# Patient Record
Sex: FEMALE | Race: WHITE | Employment: UNEMPLOYED | URBAN - METROPOLITAN AREA
[De-identification: names, ages, dates, MRNs, and addresses within clinical notes are randomized per-mention and may not be internally consistent; named-entity substitution may affect disease eponyms.]

---

## 2017-08-02 LAB
ANTIBODY SCREEN, EXTERNAL: NORMAL
CHLAMYDIA, EXTERNAL: NEGATIVE
HBSAG, EXTERNAL: NORMAL
HEPATITIS C AB,   EXT: NEGATIVE
HIV, EXTERNAL: NORMAL
N. GONORRHEA, EXTERNAL: NEGATIVE
RPR, EXTERNAL: NEGATIVE
RUBELLA, EXTERNAL: NORMAL
TYPE, ABO & RH, EXTERNAL: NORMAL

## 2017-12-13 ENCOUNTER — OFFICE VISIT (OUTPATIENT)
Dept: FAMILY MEDICINE CLINIC | Age: 32
End: 2017-12-13

## 2017-12-13 VITALS
BODY MASS INDEX: 37.77 KG/M2 | OXYGEN SATURATION: 98 % | HEIGHT: 66 IN | HEART RATE: 78 BPM | SYSTOLIC BLOOD PRESSURE: 113 MMHG | WEIGHT: 235 LBS | TEMPERATURE: 96.2 F | RESPIRATION RATE: 18 BRPM | DIASTOLIC BLOOD PRESSURE: 58 MMHG

## 2017-12-13 DIAGNOSIS — H02.823 MILIA OF EYELIDS OF BOTH EYES: ICD-10-CM

## 2017-12-13 DIAGNOSIS — H02.826 MILIA OF EYELIDS OF BOTH EYES: ICD-10-CM

## 2017-12-13 DIAGNOSIS — D36.7 DERMOID CYST OF LEG, RIGHT: Primary | ICD-10-CM

## 2017-12-13 RX ORDER — HYDROXYPROGESTERONE CAPROATE 250 MG/ML
250 INJECTION INTRAMUSCULAR
COMMUNITY
End: 2018-05-14

## 2017-12-13 NOTE — MR AVS SNAPSHOT
Visit Information Date & Time Provider Department Dept. Phone Encounter #  
 12/13/2017  9:30 AM Gwen Ibarra, [address] [facility] [phone] [id number] Follow-up Instructions Return if symptoms worsen or fail to improve. Follow-up and Disposition History Upcoming Health Maintenance Date Due DTaP/Tdap/Td series (1 - Tdap) 1/4/2005 PAP AKA CERVICAL CYTOLOGY 1/4/2005 Influenza Age 5 to Adult 8/1/2017 Allergies as of 12/13/2017  Review Complete On: 12/13/2017 By: Gwen Ibarra MD  
 No Known Allergies Current Immunizations  Never Reviewed No immunizations on file. Not reviewed this visit You Were Diagnosed With   
  
 Codes Comments Dermoid cyst of leg, right    -  Primary ICD-10-CM: D23.71 ICD-9-CM: 216.7 Milia of eyelids of both eyes     ICD-10-CM: H02.823, H02.826 ICD-9-CM: 374.84 Vitals BP Pulse Temp Resp Height(growth percentile) Weight(growth percentile) 113/58 78 96.2 °F (35.7 °C) (Oral) 18 5' 6\" (1.676 m) 235 lb (106.6 kg) SpO2 BMI OB Status Smoking Status 98% 37.93 kg/m2 Pregnant Former Smoker Vitals History BMI and BSA Data Body Mass Index Body Surface Area  
 37.93 kg/m 2 2.23 m 2 Preferred Pharmacy Pharmacy Name Phone [facility] [address]  [facility] [phone] Your Updated Medication List  
  
   
This list is accurate as of: 12/13/17 10:43 AM.  Always use your most recent med list.  
  
  
  
  
 MARYAM 250 mg/mL (1 mL) Oil injection Generic drug:  HYDROXYprogesterone (PF)  
250 mg by IntraMUSCular route every seven (7) days. prenatal multivit-ca-min-fe-fa Tab Take  by mouth. We Performed the Following REFERRAL TO GENERAL SURGERY [REF27 Custom] Follow-up Instructions Return if symptoms worsen or fail to improve. Referral Information Referral ID Referred By Referred To  
  
 [id number] [facility], Kevin Elliott MD   
   [id number] [facility] [address] [place] [address]   
   Rosebud Harada, [facility] Phone: [phone] Fax: [phone] Visits Status Start Date End Date 10 Authorized 12/13/17 12/13/18 If your referral has a status of pending review or denied, additional information will be sent to support the outcome of this decision. Introducing [facility] & HEALTH SERVICES! [employer] introduces [email or web address] patient portal. Now you can access parts of your medical record, email your doctor's office, and request medication refills online. 1. In your internet browser, go to [email or web address] 2. Click on the First Time User? Click Here link in the Sign In box. You will see the New Member Sign Up page. 3. Enter your [email or web address] Access Code exactly as it appears below. You will not need to use this code after youve completed the sign-up process. If you do not sign up before the expiration date, you must request a new code. · [email or web address] Access Code: [email or web address] Expires: 3/13/2018 10:34 AM 
 
4. Enter the last four digits of your Social Security Number (xxxx) and Date of Birth (mm/dd/yyyy) as indicated and click Submit. You will be taken to the next sign-up page. 5. Create a [email or web address] ID. This will be your [email or web address] login ID and cannot be changed, so think of one that is secure and easy to remember. 6. Create a [email or web address] password. You can change your password at any time. 7. Enter your Password Reset Question and Answer. This can be used at a later time if you forget your password. 8. Enter your e-mail address. You will receive e-mail notification when new information is available in [address] 9. Click Sign Up. You can now view and download portions of your medical record. 10. Click the Download Summary menu link to download a portable copy of your medical information. If you have questions, please visit the Frequently Asked Questions section of the [employer] website. Remember, Bromium is NOT to be used for urgent needs. For medical emergencies, dial 911. Now available from your iPhone and Android! Please provide this summary of care documentation to your next provider. Your primary care clinician is listed as Kailey Joiner. If you have any questions after today's visit, please call [phone].

## 2017-12-13 NOTE — PROGRESS NOTES
Gabriella Toth is a 34 y.o.  female and presents with     Chief Complaint   Patient presents with    Mass    Hemorrhoids       Pt is here to establish care. Pt is 6 months pregnant. Pt has some lumps underneath her skin on her right thigh and left arm. Pt  Has h/o lumps in the past and 8 years ago they were removed and she told  They were cysts. Pt also has some constipation and hemorrhoids. Pt  Used to smoke but no more . Pt does not drink alcohol. No h/o gestational DM. Pt had early pregnancy and is currently on  progesetrone hormone . No past medical history on file. No past surgical history on file. Current Outpatient Prescriptions   Medication Sig    prenatal multivit-ca-min-fe-fa tab Take  by mouth.  HYDROXYprogesterone, PF, (MARYAM) 250 mg/mL (1 mL) oil injection 250 mg by IntraMUSCular route every seven (7) days. No current facility-administered medications for this visit. Health Maintenance   Topic Date Due    DTaP/Tdap/Td series (1 - Tdap) 01/04/2005    PAP AKA CERVICAL CYTOLOGY  01/04/2005    Influenza Age 5 to Adult  08/01/2017       There is no immunization history on file for this patient. No LMP recorded. Patient is pregnant. Allergies and Intolerances:   No Known Allergies    Family History:   No family history on file. Social History:   She  reports that she has quit smoking. She has never used smokeless tobacco.  She  reports that she drinks alcohol.             Review of Systems:   General: negative for - chills, fatigue, fever, weight change  Psych: negative for - anxiety, depression, irritability or mood swings  ENT: negative for - headaches, hearing change, nasal congestion, oral lesions, sneezing or sore throat  Heme/ Lymph: negative for - bleeding problems, bruising, pallor or swollen lymph nodes  Endo: negative for - hot flashes, polydipsia/polyuria or temperature intolerance  Resp: negative for - cough, shortness of breath or wheezing  CV: negative for - chest pain, edema or palpitations  GI: negative for - abdominal pain, change in bowel habits, constipation, diarrhea or nausea/vomiting  : negative for - dysuria, hematuria, incontinence, pelvic pain or vulvar/vaginal symptoms  MSK: negative for - joint pain, joint swelling or muscle pain  Neuro: negative for - confusion, headaches, seizures or weakness  Derm: negative for - dry skin, hair changes, rash or skin lesion changes, pos for cyst on rt leg,           Physical:   Vitals:   Vitals:    12/13/17 0937   BP: 113/58   Pulse: 78   Resp: 18   Temp: 96.2 °F (35.7 °C)   TempSrc: Oral   SpO2: 98%   Weight: 235 lb (106.6 kg)   Height: 5' 6\" (1.676 m)           Exam:   HEENT- atraumatic,normocephalic, awake, oriented, well nourished, milia under eyelids  Neck - supple,no enlarged lymph nodes, no JVD, no thyromegaly  Chest- CTA, no rhonchi, no crackles  Heart- rrr, no murmurs / gallop/rub  Abdomen- soft,BS+,NT, no hepatosplenomegaly  Ext - no c/c/edema , cyst  On rt leg , dernal, tender to touch, gravid uterus  Neuro- no focal deficits. Power 5/5 all extremities  Skin - warm,dry, no obvious rashes. Review of Data:   LABS:   No results found for: WBC, HGB, HCT, PLT, HGBEXT, HCTEXT, PLTEXT, HGBEXT, HCTEXT, PLTEXT  No results found for: NA, K, CL, CO2, GLU, BUN, CREA  No results found for: CHOL, CHOLX, CHLST, CHOLV, HDL, LDL, LDLC, DLDLP, TGLX, TRIGL, TRIGP  No results found for: GPT        Impression / Plan:        ICD-10-CM ICD-9-CM    1. Dermoid cyst of leg, right D23.71 216.7 REFERRAL TO GENERAL SURGERY   2. Milia of eyelids of both eyes H02.823 374.84     H02.826       Constipation , external hemorrhoids- wll need referaal to colorectal after she delivers baby. Avoid cosntipation. Explained to patient risk benefits of the medications. Advised patient to stop meds if having any side effects. Pt verbalized understanding of the instructions.     I have discussed the diagnosis with the patient and the intended plan as seen in the above orders. The patient has received an after-visit summary and questions were answered concerning future plans. I have discussed medication side effects and warnings with the patient as well. I have reviewed the plan of care with the patient, accepted their input and they are in agreement with the treatment goals. Reviewed plan of care. Patient has provided input and agrees with goals.     Follow-up Disposition: Not on Christa Boland MD

## 2018-01-31 ENCOUNTER — OFFICE VISIT (OUTPATIENT)
Dept: SURGERY | Age: 33
End: 2018-01-31

## 2018-01-31 VITALS
BODY MASS INDEX: 37.12 KG/M2 | WEIGHT: 231 LBS | TEMPERATURE: 96.5 F | HEART RATE: 84 BPM | HEIGHT: 66 IN | RESPIRATION RATE: 20 BRPM | SYSTOLIC BLOOD PRESSURE: 109 MMHG | DIASTOLIC BLOOD PRESSURE: 74 MMHG

## 2018-01-31 DIAGNOSIS — D17.23 LIPOMA OF RIGHT LOWER EXTREMITY: Primary | ICD-10-CM

## 2018-01-31 NOTE — PROGRESS NOTES
Hernia Evaluation      Subjective:     Eddie Reza is a 28 y.o. female with a history of multiple small painful nodules in her subcutaneous fat. These have been excised in the past on her thigh and left upper arm. She has 3 new ones on her lateral right thigh, 2 of which are painful. They have been present for about 3 months. She is about 6 months pregnant. There are no active problems to display for this patient. No past medical history on file. No past surgical history on file. Social History   Substance Use Topics    Smoking status: Former Smoker    Smokeless tobacco: Never Used    Alcohol use Yes      Family History   Problem Relation Age of Onset    No Known Problems Mother     Hypertension Father     Cancer Father       Current Outpatient Prescriptions   Medication Sig    prenatal multivit-ca-min-fe-fa tab Take  by mouth.  HYDROXYprogesterone, PF, (MARYAM) 250 mg/mL (1 mL) oil injection 250 mg by IntraMUSCular route every seven (7) days. No current facility-administered medications for this visit.        No Known Allergies     Review of Systems:  Positive in BOLD    CONST: Fever, weight loss, fatigue or chills  GI: Nausea, vomiting, abdominal pain, change in bowel habits, hematochezia, melena, and GERD   INTEG: Dermatitis, abnormal moles, subcutaneous nodules  HEENT: Recent changes in vision, vertigo, epistaxis, dysphagia and hoarseness  CV: Chest pain, palpitations, HTN, edema and varicosities  RESP: Cough, shortness of breath, wheezing, hemoptysis, snoring and reactive airway disease  : Hematuria, dysuria, frequency, urgency, nocturia and stress urinary incontinence   MS: Weakness, joint pain and arthritis  ENDO: Diabetes, thyroid disease, polyuria, polydipsia, polyphagia, poor wound healing, heat intolerance, cold intolerance  LYMPH/HEME: Anemia, bruising and history of blood transfusions  NEURO: Dizziness, headache, fainting, seizures and stroke  PSYCH: Anxiety and depression    Objective:     Visit Vitals    /74 (BP 1 Location: Left arm, BP Patient Position: At rest)    Pulse 84    Temp 96.5 °F (35.8 °C) (Oral)    Resp 20    Ht 5' 6\" (1.676 m)    Wt 104.8 kg (231 lb)    BMI 37.28 kg/m2       Physical Exam:      GENERAL: alert, cooperative, no distress, appears stated age  EYE:conjunctivae and sclerae normal, pupils equal, round, reactive to light, extraocular movements intact without nystagmus  LUNG: clear to auscultation bilaterally  HEART: Regular rate and rhythm  EXTREMITIES:  extremities normal, atraumatic, no cyanosis or edema  SKIN: Right thigh - 3 small subcutaneous nodules consistent with lipomata - 2 - 1.5 cm and one 1 cm in size. Assessment:   Symptomatic lipomata of the right thigh. I explained that based on size, they do not need to be excised but can be if they enlarge or if she desires based on discomfort. She wishes them removed for comfort reasons . Plan:   I explained the indications for excision of multiple lipomata as well as the alternatives. I discussed the potential risks, benefits, and likely outcomes of this course of care, including but not limited to bleeding, wound infection, need for reoperation, injury to surrounding structures, lipoma recurrence, failure to improve or even worsen her pain, anesthetic risks and imponderables. The patient indicates understanding of the risks and wishes to proceed.   This will be scheduled in the near future in the office    Signed By: Jillian Arteaga MD     January 31, 2018

## 2018-01-31 NOTE — PROGRESS NOTES
Genie Romo is a 28 y.o. female who presents today with   Chief Complaint   Patient presents with    Mass     Pt reports today c/o of multiple masses in right thigh x1 year that cause pain at times. Pt reports she is  33 weeks pregnant. 1. Have you been to the ER, urgent care clinic since your last visit? Hospitalized since your last visit? No    2. Have you seen or consulted any other health care providers outside of the [address] [place] [address] since your last visit? Include any pap smears or colon screening.  No

## 2018-01-31 NOTE — MR AVS SNAPSHOT
[address] [facility] 83 [id number] [phone] Patient: Ruby Dumont MRN: [id number] [facility]:[birth year] Visit Information Date & Time Provider Department Dept. Phone Encounter #  
 1/31/2018  1:00 PM Netta Trivedi MD [facility] [phone] [id number] Upcoming Health Maintenance Date Due DTaP/Tdap/Td series (1 - Tdap) 1/4/2005 PAP AKA CERVICAL CYTOLOGY 1/4/2005 Influenza Age 5 to Adult 8/1/2017 Allergies as of 1/31/2018  Review Complete On: 1/31/2018 By: Vianca Alcantara No Known Allergies Current Immunizations  Never Reviewed No immunizations on file. Not reviewed this visit Vitals BP Pulse Temp Resp Height(growth percentile) Weight(growth percentile) 109/74 (BP 1 Location: Left arm, BP Patient Position: At rest) 84 96.5 °F (35.8 °C) (Oral) 20 5' 6\" (1.676 m) 231 lb (104.8 kg) BMI OB Status Smoking Status 37.28 kg/m2 Pregnant Former Smoker BMI and BSA Data Body Mass Index Body Surface Area  
 37.28 kg/m 2 2.21 m 2 Preferred Pharmacy Pharmacy Name Phone [facility] [address]  [facility] [phone] Your Updated Medication List  
  
   
This list is accurate as of: 1/31/18  1:24 PM.  Always use your most recent med list.  
  
  
  
  
 MARYAM 250 mg/mL (1 mL) Oil injection Generic drug:  HYDROXYprogesterone (PF)  
250 mg by IntraMUSCular route every seven (7) days. prenatal multivit-ca-min-fe-fa Tab Take  by mouth. Patient Instructions If you have any questions or concerns about today's appointment, the verbal and/or written instructions you were given for follow up care, please call our office at [phone]. [facility] - [place] [address] [place] [address], [address] [place] [phone] office [phone] Introducing [facility] & HEALTH SERVICES! [employer] introduces [employer] patient portal. Now you can access parts of your medical record, email your doctor's office, and request medication refills online. 1. In your internet browser, go to [email or web address] 2. Click on the First Time User? Click Here link in the Sign In box. You will see the New Member Sign Up page. 3. Enter your [employer] Access Code exactly as it appears below. You will not need to use this code after youve completed the sign-up process. If you do not sign up before the expiration date, you must request a new code. · [employer] Access Code: [email or web address] Expires: 3/13/2018 10:34 AM 
 
4. Enter the last four digits of your Social Security Number (xxxx) and Date of Birth (mm/dd/yyyy) as indicated and click Submit. You will be taken to the next sign-up page. 5. Create a [employer] ID. This will be your [employer] login ID and cannot be changed, so think of one that is secure and easy to remember. 6. Create a [employer] password. You can change your password at any time. 7. Enter your Password Reset Question and Answer. This can be used at a later time if you forget your password. 8. Enter your e-mail address. You will receive e-mail notification when new information is available in [address] 9. Click Sign Up. You can now view and download portions of your medical record. 10. Click the Download Summary menu link to download a portable copy of your medical information. If you have questions, please visit the Frequently Asked Questions section of the [employer] website. Remember, [employer] is NOT to be used for urgent needs. For medical emergencies, dial 911. Now available from your iPhone and Android! Please provide this summary of care documentation to your next provider. Your primary care clinician is listed as Chapin Ordaz.  If you have any questions after today's visit, please call [phone].

## 2018-01-31 NOTE — PATIENT INSTRUCTIONS
If you have any questions or concerns about today's appointment, the verbal and/or written instructions you were given for follow up care, please call our office at [phone].     [facility] - [address]    [phone] office  [phone]

## 2018-02-12 ENCOUNTER — TELEPHONE (OUTPATIENT)
Dept: SURGERY | Age: 33
End: 2018-02-12

## 2018-02-12 LAB — GRBS, EXTERNAL: POSITIVE

## 2018-02-12 NOTE — TELEPHONE ENCOUNTER
Call patient to set up appointment to have cyst removed from leg and she stated she was ok no longer interested.

## 2018-02-14 LAB
GRBS, EXTERNAL: NEGATIVE
GRBS, EXTERNAL: POSITIVE

## 2018-02-26 ENCOUNTER — HOSPITAL ENCOUNTER (EMERGENCY)
Age: 33
Discharge: HOME | DRG: 540 | End: 2018-02-26
Attending: OBSTETRICS & GYNECOLOGY | Admitting: OBSTETRICS & GYNECOLOGY
Payer: MEDICAID

## 2018-02-26 VITALS — TEMPERATURE: 98.5 F | HEART RATE: 80 BPM | DIASTOLIC BLOOD PRESSURE: 57 MMHG | SYSTOLIC BLOOD PRESSURE: 118 MMHG

## 2018-02-26 LAB
ABO + RH BLD: NORMAL
BLOOD GROUP ANTIBODIES SERPL: NORMAL
ERYTHROCYTE [DISTWIDTH] IN BLOOD BY AUTOMATED COUNT: 14 % (ref 11.6–14.5)
HCT VFR BLD AUTO: 36.6 % (ref 35–45)
HGB BLD-MCNC: 12.6 G/DL (ref 12–16)
MCH RBC QN AUTO: 29.4 PG (ref 24–34)
MCHC RBC AUTO-ENTMCNC: 34.4 G/DL (ref 31–37)
MCV RBC AUTO: 85.3 FL (ref 74–97)
PLATELET # BLD AUTO: 192 K/UL (ref 135–420)
PMV BLD AUTO: 9.9 FL (ref 9.2–11.8)
RBC # BLD AUTO: 4.29 M/UL (ref 4.2–5.3)
SPECIMEN EXP DATE BLD: NORMAL
WBC # BLD AUTO: 11.7 K/UL (ref 4.6–13.2)

## 2018-02-26 PROCEDURE — 59025 FETAL NON-STRESS TEST: CPT

## 2018-02-26 PROCEDURE — 96374 THER/PROPH/DIAG INJ IV PUSH: CPT

## 2018-02-26 PROCEDURE — 96372 THER/PROPH/DIAG INJ SC/IM: CPT

## 2018-02-26 PROCEDURE — 74011250636 HC RX REV CODE- 250/636: Performed by: OBSTETRICS & GYNECOLOGY

## 2018-02-26 PROCEDURE — 86900 BLOOD TYPING SEROLOGIC ABO: CPT | Performed by: OBSTETRICS & GYNECOLOGY

## 2018-02-26 PROCEDURE — 96360 HYDRATION IV INFUSION INIT: CPT

## 2018-02-26 PROCEDURE — 85027 COMPLETE CBC AUTOMATED: CPT | Performed by: OBSTETRICS & GYNECOLOGY

## 2018-02-26 PROCEDURE — 59412 ANTEPARTUM MANIPULATION: CPT

## 2018-02-26 PROCEDURE — 74011250636 HC RX REV CODE- 250/636

## 2018-02-26 PROCEDURE — 96361 HYDRATE IV INFUSION ADD-ON: CPT

## 2018-02-26 RX ORDER — SODIUM CHLORIDE, SODIUM LACTATE, POTASSIUM CHLORIDE, CALCIUM CHLORIDE 600; 310; 30; 20 MG/100ML; MG/100ML; MG/100ML; MG/100ML
125 INJECTION, SOLUTION INTRAVENOUS CONTINUOUS
Status: DISCONTINUED | OUTPATIENT
Start: 2018-02-26 | End: 2018-02-26 | Stop reason: HOSPADM

## 2018-02-26 RX ORDER — TERBUTALINE SULFATE 1 MG/ML
INJECTION SUBCUTANEOUS
Status: COMPLETED
Start: 2018-02-26 | End: 2018-02-26

## 2018-02-26 RX ORDER — TERBUTALINE SULFATE 1 MG/ML
0.25 INJECTION SUBCUTANEOUS
Status: COMPLETED | OUTPATIENT
Start: 2018-02-26 | End: 2018-02-26

## 2018-02-26 RX ADMIN — TERBUTALINE SULFATE 0.25 MG: 1 INJECTION SUBCUTANEOUS at 09:44

## 2018-02-26 RX ADMIN — HUMAN RHO(D) IMMUNE GLOBULIN 0.3 MG: 300 INJECTION, SOLUTION INTRAMUSCULAR at 10:30

## 2018-02-26 RX ADMIN — SODIUM CHLORIDE, SODIUM LACTATE, POTASSIUM CHLORIDE, AND CALCIUM CHLORIDE 125 ML/HR: 600; 310; 30; 20 INJECTION, SOLUTION INTRAVENOUS at 08:05

## 2018-02-26 NOTE — H&P
[facility]  PRE-OP HISTORY AND PHYSICAL    Ronaldo CORNEJO  MR#: [id number]  : 1985  ACCOUNT #: [de-identified]   DATE OF SERVICE: 2018    HISTORY OF PRESENT ILLNESS:  This is a 76-year-old -2-0-3 at 37 weeks and 1 day with an STEFANIE of 2018 by a 7 week and 5 day ultrasound in the emergency room. This was confirmed by 12-week and 2 day ultrasound that was done at [facility] where she has followed since 12 weeks and 2 days. Patient was found to be in thomas breech presentation on 2018 at 36 weeks and 1 day and she desires an external cephalic version. She denies any other complaints including contractions, vaginal bleeding, or loss of fluid. She does report positive fetal movements. OB HISTORY:  1.  G1 2003  at 38 weeks, 6 pounds 9 ounces, female, delivered at [facility].  2.  G2 2004  at 26 weeks per patient. She reports PPROM and eventually went into labor 10 days later. Baby weighed 4 pounds 5 ounces, male, delivered at the [facility].  3.  G3 01/10/2006  at 32 weeks per patient. Delivered at [facility].  Reports PPROM and  labor. Baby weighed 5 pounds, male. 4.  G4 current. She was followed with cervical lengths and received weekly [place] injections due to her history of  delivery. She received RhoGAM 2017 by [facility] and at [facility] on 2017 at 28 weeks and 3 days. GYNECOLOGIC HISTORY:  Patient with unsure LMP, however regular cycles. Denies any history of any STIs or HSV. Her last Pap smear was 2017 showing ASCUS, HPV negative. Patient has a history of abnormal Pap smears with a colposcopy in . All normal Pap smears since then. PAST MEDICAL HISTORY:    1.  Obesity, BMI 37.    2. Suspected lipoma tumor on the lateral right thigh.   She is following with Dr. Gabbie Garcia, general surgeon, for possible excision which is no longer planned. PAST SURGICAL HISTORY:  Patient denies. MEDICATIONS:    1. Pepcid 1 tablet p.o. daily. 2.  Prenatal vitamin p.o. daily. ALLERGIES:  NO KNOWN DRUG ALLERGIES. FAMILY HISTORY:  1.  Lung cancer in maternal grandfather, unknown age of diagnosis. Now . 2.  Non-Hodgkin's lymphoma in father who is living, in remission. SOCIAL HISTORY:  She smoked 1 pack per day at the beginning of pregnancy and quit at 32 weeks after cutting back. She has been smoking for eight years. She denies any alcohol or illicit drug use, but h/o marijuana use prior to pregnancy. 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 PHYSICAL EXAMINATION:  VITAL SIGNS:  Blood pressure 107/62, weight 240 pounds, height 5 feet 6 inches. BMI 37 at the beginning of pregnancy. GENERAL:  Alert and oriented, no apparent distress. CARDIOVASCULAR:  Regular rate and rhythm. LUNGS:  Clear to auscultation bilaterally. ABDOMEN:  Soft, nondistended, gravid, nontender to palpation. EXTREMITIES:  No clubbing, cyanosis, or edema. Negative calf tenderness. No redness. 3cm mass superificial R lateral thigh. LABORATORY/PRENATAL LABORATORY DATA:  Blood type is B negative, antibody negative, VDRL, hep B surface antigen, HIV, gonorrhea and chlamydia, hep C all negative. Rubella immune. TSH within normal limits. AFP negative. First trimester screen within normal limits. Most recent hemoglobin 2017 of 11.3, platelets 937. One-hour Glucola test 159, three hour within normal limits. Repeat antibody screen at 28 weeks negative, GBS positive. Ultrasound at 36 weeks and 1 day showing thomas breech presentation, EFW 2806 grams 46th percentile, SDP 7.5cm anterior fundal placenta, previously normal morphology ultrasound. REVIEW OF SYSTEMS:  Negative other than stated in the HPI. ASSESSMENT AND PLAN:  This is a 71-year-old G4, P1-2-0-3, 37 weeks and 1 day.   Patient found to be in thomas breech presentation. She desires external cephalic version. She was counseled on the associated risks and benefits and desires to proceed. She has an external cephalic version scheduled 02/26/2018 at 8:30 in the morning.       Manual MD JONAH Cancino / TN  D: 02/25/2018 19:07     T: 02/25/2018 20:23  JOB #: [id number]

## 2018-02-26 NOTE — H&P
Obstetrical Problem History & Physical     Name: Erwin Soria MRN: [id number]  SSN: [id number]    YOB: 1985  Age: 28 y.o. Sex: female        Subjective:   Chief complaint:  No chief complaint on file. 45HD [id number] at 37.1wks with STEFANIE 3/18/18 by 7.5wk US in ED, consistent with 12.2wk US at [address], unknown LMP. Patient has followed with [address] since 12.2wks. She was found to be in thomas breech presentation by US at 36.1wks. She desires ECV which is scheduled for today. She is NPO. She denies complaints. Reports irregular contractions, no vaginal bleeding/loss of fluid. +FM. OB HISTORY  OB History      Para Term  AB Living    1         SAB TAB Ectopic Molar Multiple Live Births                 G1 - 3/21/03 -  at 38wks, Beverlyn Nancy, F, [address]  G2 - 04 -  at 26wks per pt, but reports weight 4lb5oz, PPROM/PTL 10 days later, M, [facility]  G3 - 1/10/106 -  at 32wks per pt, PPROM/PTL, M, [facility]  G4 - Followed with [facility] (wnl) until 24wks, Nova wkly injections until 36wks. S/p Rhogam 2017 and 17 (28.3wks). GYNECOLOGY HISTORY:   Regular cycles, but unknown LMP. Last pap 17 ASCUS, HPV neg. History of abnormal pap with colposcopy , all normal paps since then. Denies h/o STIs, HSV. PAST MEDICAL HISTORY  No past medical history on file. Right lateral thigh mass (likely lipoma) - patient follows with Dr. Uday Byrne, general surgery. Initially planned excision, now no longer desires. Obesity - BMI 37 pre pregnancy    PAST SURGICAL HISTORY  No past surgical history on file. Denies    SOCIAL HISTORY  Social History     Social History    Marital status:      Spouse name: N/A    Number of children: N/A    Years of education: N/A     Occupational History    Not on file.      Social History Main Topics    Smoking status: Not on file    Smokeless tobacco: Not on file    Alcohol use Not on file    Drug use: Not on file    Sexual activity: Not on file     Other Topics Concern    Not on file     Social History Narrative    No narrative on file     Smoked 1ppd cigarettes prior to pregnancy, quit at 32wks. Denies alcohol/illicit drug use. H/o marijuana use, quit 2017. Stay at home mother. FAMILY HISTORY  No family history on file. Lung cancer - maternal grandfather, , unknown age of diagnosis  Non-Hodgkin's lymphoma - father, living in remission     ALLERGY:  No Known Allergies   NKDA    HOME MEDICATIONS:  Prior to Admission medications    Medication Sig Start Date End Date Taking? Authorizing Provider   promethazine (PHENERGAN) 25 mg tablet Take 1 Tab by mouth every six (6) hours as needed. 17   SIMON Pelayo   Prenatal vitamin po daily       Review of Systems:  A comprehensive review of systems was negative     Prenatal Labs  Blood type:  B negative  Antibody:  Negative  RPR/HepBsAg/HIV/HepC:  negative  GC/CT:  negative  1hr GTT:  159  3hr GTT:  wnl  TSH:  wnl  FTS/AFP:  wnl  Most recent Hgb 11.3, Plts 204 on 17  GBS positive    Objective:   VITAL SIGNS:  There were no vitals taken for this visit. Physical Exam:  Gen:  NAD, A&O  CV:  RRR  Pulm:  CTAB  Abd: Gravid, nontender, soft, non distended  Extremities: nontender, trace edema LE bilaterally, no clubbing/cyanosis/redness    BSUS:  Breech    TOCO:  No contractions  FHTs;  130s, mod variability, +accels, no decels    Last growth US in office at 36.1wks - EFW 2806g, 46%, SDP 7.5cm, anterior fundal placenta    Assessment:     34yo [id number] at 37.1wks with breech presentation    Plan:     Patient desires ECV. Discussed risks and benefits of ECV with patient including but not limited to risks of ROM, abruption, labor, fetal distress, maternal pain/discomfort, and failure.   Reviewed risks of CS with risks of infection, bleeding, possible blood transfusion, injury to other organs/baby, anesthesia complications, post op pain, need for other indicated procedures. Patient voices an understanding and consents to proceed with ECV.     Signed By:    Krishna Mullen MD  February 26, 2018 8:20 AM

## 2018-02-26 NOTE — PROGRESS NOTES
0745  Received  37 weeks  From home for scheduled version for breech presentation. Dr Brent Olivera in ultrasound done to verify breech presentation. [address] , version  Begun by Dr Brent Olivera, Dr Yoon Pate. 1015 unsuccessful version. .1045 Patient discharged to home instructed to call office for follow-up to schedule primary csection.

## 2018-02-26 NOTE — PROCEDURES
External Cephalic Version Procedure    Patient: Erwin Soria MRN: [id number]  SSN: [id number]    YOB: 1985  Age: 28 y.o. Sex: female      Diagnosis: maternity  Breech presentation     Patient Active Problem List   Diagnosis Code    Breech presentation O32Myla Mack is a 28 y.o.  female who is 3/18/2018, by Other Basis who is being admitted for external cephalic version. Admitted to 3415/01. We have already discussed the risks, benefits, and alternatives in my office and I have given them written information on the same. I also repeated some of this information and answered all her questions. No Known Allergies  <principal problem not specified>  OB History    Para Term  AB Living   4 3       SAB TAB Ectopic Molar Multiple Live Births              # Outcome Date GA Lbr Zaki/2nd Weight Sex Delivery Anes PTL Lv   4 Current            3 Para            2 Para            1 Para                 No past medical history on file. No past surgical history on file. No family history on file. Social History     Social History    Marital status:      Spouse name: N/A    Number of children: N/A    Years of education: N/A     Occupational History    Not on file. Social History Main Topics    Smoking status: Not on file    Smokeless tobacco: Not on file    Alcohol use Not on file    Drug use: Not on file    Sexual activity: Not on file     Other Topics Concern    Not on file     Social History Narrative    No narrative on file       Objective:  Visit Vitals    /57    Pulse 80    Temp 98.5 °F (36.9 °C)     Baseline FHR: Patient Vitals for the past 4 hrs: Mode Fetal Heart Rate Fetal Activity   18 0801 External 160 Present        The patient was known to be breech recently in the office, and was told the benefits and risks to external version and an information packet was previously given to her.            A non stress test test was done and was reactive with 15 BPM more than twice in ten minutes. The variability was good  and no worrisome decelerations were noted. The plan is for external version with this reactive test        An ultrasound was done to confirm breech presentation, adequate fluid and placental location. Fetal motion and breathing as well as tone were noted. Under ultrasound guidance  A forward flip was first attempted. A back flip was also attempted if the baby did not easily turn forward repeated attempts were done until the fetus did not turn. Terbutaline x1 was given. No contractions remained on the monitor. A repeat ultrasound was done following the procedure to confirm fetal wellbeing with motion, fluid, tone and breathing as well as reconfirm the position of breech. The heart rate was monitored after for reactivty and was reactive with 15 BPM for 15 seconds atleast twice in 20 minutes before releasing the patient   Patient tolerated well. She will follow-up in the office within an week for confirmation of presentation and scheduling of  if still needed.     Lorelei Michel MD  2018  9:56 AM

## 2018-02-27 ENCOUNTER — ANESTHESIA EVENT (OUTPATIENT)
Dept: LABOR AND DELIVERY | Age: 33
DRG: 540 | End: 2018-02-27
Payer: MEDICAID

## 2018-02-27 ENCOUNTER — HOSPITAL ENCOUNTER (INPATIENT)
Age: 33
LOS: 2 days | Discharge: HOME | DRG: 540 | End: 2018-03-01
Attending: OBSTETRICS & GYNECOLOGY | Admitting: OBSTETRICS & GYNECOLOGY
Payer: MEDICAID

## 2018-02-27 ENCOUNTER — ANESTHESIA (OUTPATIENT)
Dept: LABOR AND DELIVERY | Age: 33
DRG: 540 | End: 2018-02-27
Payer: MEDICAID

## 2018-02-27 DIAGNOSIS — Z3A.40 40 WEEKS GESTATION OF PREGNANCY: Primary | ICD-10-CM

## 2018-02-27 PROBLEM — Z34.90 PREGNANCY: Status: ACTIVE | Noted: 2018-02-27

## 2018-02-27 LAB
ABO + RH BLD: NORMAL
BASOPHILS # BLD: 0 K/UL (ref 0–0.06)
BASOPHILS NFR BLD: 0 % (ref 0–2)
BLD PROD TYP BPU: NORMAL
BLOOD BANK CMNT PATIENT-IMP: NORMAL
BLOOD GROUP ANTIBODIES SERPL: NORMAL
BLOOD GROUP ANTIBODIES SERPL: NORMAL
BPU ID: NORMAL
CALLED TO:,BCALL1: NORMAL
DIFFERENTIAL METHOD BLD: ABNORMAL
EOSINOPHIL # BLD: 0.1 K/UL (ref 0–0.4)
EOSINOPHIL NFR BLD: 1 % (ref 0–5)
ERYTHROCYTE [DISTWIDTH] IN BLOOD BY AUTOMATED COUNT: 13.9 % (ref 11.6–14.5)
GA (WEEKS): 37 WK
GRBS, EXTERNAL: POSITIVE
GTT 120 MIN, EXTERNAL: 153
GTT 180 MIN, EXTERNAL: 76
GTT 60 MIN, EXTERNAL: 116
GTT, 1 HR, GLUCOLA, EXTERNAL: 159
GTT, FASTING, EXTERNAL: 63
HCT VFR BLD AUTO: 34.6 % (ref 35–45)
HGB BLD-MCNC: 11.8 G/DL (ref 12–16)
LYMPHOCYTES # BLD: 1.9 K/UL (ref 0.9–3.6)
LYMPHOCYTES NFR BLD: 20 % (ref 21–52)
MCH RBC QN AUTO: 29.3 PG (ref 24–34)
MCHC RBC AUTO-ENTMCNC: 34.1 G/DL (ref 31–37)
MCV RBC AUTO: 85.9 FL (ref 74–97)
MONOCYTES # BLD: 0.6 K/UL (ref 0.05–1.2)
MONOCYTES NFR BLD: 7 % (ref 3–10)
NEUTS SEG # BLD: 7 K/UL (ref 1.8–8)
NEUTS SEG NFR BLD: 72 % (ref 40–73)
PLATELET # BLD AUTO: 188 K/UL (ref 135–420)
PMV BLD AUTO: 10.1 FL (ref 9.2–11.8)
RBC # BLD AUTO: 4.03 M/UL (ref 4.2–5.3)
SPECIMEN EXP DATE BLD: NORMAL
STATUS OF UNIT,%ST: NORMAL
TSH SERPL-ACNC: NORMAL M[IU]/L
TYPE, ABO & RH, EXTERNAL: NORMAL
UNIT DIVISION, %UDIV: 0
WBC # BLD AUTO: 9.5 K/UL (ref 4.6–13.2)

## 2018-02-27 PROCEDURE — 74011250637 HC RX REV CODE- 250/637

## 2018-02-27 PROCEDURE — 76060000078 HC EPIDURAL ANESTHESIA: Performed by: OBSTETRICS & GYNECOLOGY

## 2018-02-27 PROCEDURE — 77030015791 HC CATH FOL DRN LXF BARD -A: Performed by: OBSTETRICS & GYNECOLOGY

## 2018-02-27 PROCEDURE — 76010000391 HC C SECN FIRST 1 HR: Performed by: OBSTETRICS & GYNECOLOGY

## 2018-02-27 PROCEDURE — 77010026065 HC OXYGEN MINIMUM MEDICAL AIR

## 2018-02-27 PROCEDURE — 77030020255 HC SOL INJ LR 1000ML BG

## 2018-02-27 PROCEDURE — 86870 RBC ANTIBODY IDENTIFICATION: CPT | Performed by: OBSTETRICS & GYNECOLOGY

## 2018-02-27 PROCEDURE — 74011250636 HC RX REV CODE- 250/636

## 2018-02-27 PROCEDURE — 76010000392 HC C SECN EA ADDL 0.5 HR: Performed by: OBSTETRICS & GYNECOLOGY

## 2018-02-27 PROCEDURE — 75410000002 HC LABOR FEE PER 1 HR

## 2018-02-27 PROCEDURE — 77030002933 HC SUT MCRYL J&J -A: Performed by: OBSTETRICS & GYNECOLOGY

## 2018-02-27 PROCEDURE — 76060000078 HC EPIDURAL ANESTHESIA

## 2018-02-27 PROCEDURE — 77030002974 HC SUT PLN J&J -A: Performed by: OBSTETRICS & GYNECOLOGY

## 2018-02-27 PROCEDURE — 74011250636 HC RX REV CODE- 250/636: Performed by: OBSTETRICS & GYNECOLOGY

## 2018-02-27 PROCEDURE — 74011250637 HC RX REV CODE- 250/637: Performed by: ADVANCED PRACTICE MIDWIFE

## 2018-02-27 PROCEDURE — 77030031139 HC SUT VCRL2 J&J -A: Performed by: OBSTETRICS & GYNECOLOGY

## 2018-02-27 PROCEDURE — 85025 COMPLETE CBC W/AUTO DIFF WBC: CPT | Performed by: OBSTETRICS & GYNECOLOGY

## 2018-02-27 PROCEDURE — 74011000250 HC RX REV CODE- 250

## 2018-02-27 PROCEDURE — 86900 BLOOD TYPING SEROLOGIC ABO: CPT | Performed by: OBSTETRICS & GYNECOLOGY

## 2018-02-27 PROCEDURE — 77030007866 HC KT SPN ANES BBMI -B: Performed by: ANESTHESIOLOGY

## 2018-02-27 PROCEDURE — 74011250636 HC RX REV CODE- 250/636: Performed by: NURSE ANESTHETIST, CERTIFIED REGISTERED

## 2018-02-27 PROCEDURE — 77030011640 HC PAD GRND REM COVD -A: Performed by: OBSTETRICS & GYNECOLOGY

## 2018-02-27 PROCEDURE — 74011000258 HC RX REV CODE- 258: Performed by: ANESTHESIOLOGY

## 2018-02-27 PROCEDURE — 75410000003 HC RECOV DEL/VAG/CSECN EA 0.5 HR

## 2018-02-27 PROCEDURE — 74011250636 HC RX REV CODE- 250/636: Performed by: ANESTHESIOLOGY

## 2018-02-27 PROCEDURE — 75410000003 HC RECOV DEL/VAG/CSECN EA 0.5 HR: Performed by: OBSTETRICS & GYNECOLOGY

## 2018-02-27 PROCEDURE — 77030020262 HC SOL INJ SOD CL 0.9% 100ML

## 2018-02-27 PROCEDURE — 77030018842 HC SOL IRR SOD CL 9% BAXT -A: Performed by: OBSTETRICS & GYNECOLOGY

## 2018-02-27 PROCEDURE — 77030028990 HC ADH TISS DERMFLX CHMP -B: Performed by: OBSTETRICS & GYNECOLOGY

## 2018-02-27 PROCEDURE — 77030032490 HC SLV COMPR SCD KNE COVD -B: Performed by: OBSTETRICS & GYNECOLOGY

## 2018-02-27 PROCEDURE — 65270000029 HC RM PRIVATE

## 2018-02-27 RX ORDER — SODIUM CHLORIDE 0.9 % (FLUSH) 0.9 %
5-10 SYRINGE (ML) INJECTION EVERY 8 HOURS
Status: DISCONTINUED | OUTPATIENT
Start: 2018-02-27 | End: 2018-02-27

## 2018-02-27 RX ORDER — CEFAZOLIN SODIUM 2 G/50ML
2 SOLUTION INTRAVENOUS ONCE
Status: DISCONTINUED | OUTPATIENT
Start: 2018-02-27 | End: 2018-02-27 | Stop reason: HOSPADM

## 2018-02-27 RX ORDER — KETOROLAC TROMETHAMINE 30 MG/ML
INJECTION, SOLUTION INTRAMUSCULAR; INTRAVENOUS AS NEEDED
Status: DISCONTINUED | OUTPATIENT
Start: 2018-02-27 | End: 2018-02-27 | Stop reason: HOSPADM

## 2018-02-27 RX ORDER — FACIAL-BODY WIPES
10 EACH TOPICAL
Status: DISCONTINUED | OUTPATIENT
Start: 2018-02-27 | End: 2018-03-01 | Stop reason: HOSPADM

## 2018-02-27 RX ORDER — SODIUM CHLORIDE, SODIUM LACTATE, POTASSIUM CHLORIDE, CALCIUM CHLORIDE 600; 310; 30; 20 MG/100ML; MG/100ML; MG/100ML; MG/100ML
1000 INJECTION, SOLUTION INTRAVENOUS CONTINUOUS
Status: DISCONTINUED | OUTPATIENT
Start: 2018-02-27 | End: 2018-02-27 | Stop reason: HOSPADM

## 2018-02-27 RX ORDER — SODIUM CHLORIDE 0.9 % (FLUSH) 0.9 %
5-10 SYRINGE (ML) INJECTION AS NEEDED
Status: DISCONTINUED | OUTPATIENT
Start: 2018-02-27 | End: 2018-03-01 | Stop reason: HOSPADM

## 2018-02-27 RX ORDER — EPHEDRINE SULFATE/0.9% NACL/PF 25 MG/5 ML
SYRINGE (ML) INTRAVENOUS AS NEEDED
Status: DISCONTINUED | OUTPATIENT
Start: 2018-02-27 | End: 2018-02-27 | Stop reason: HOSPADM

## 2018-02-27 RX ORDER — SODIUM CHLORIDE, SODIUM LACTATE, POTASSIUM CHLORIDE, CALCIUM CHLORIDE 600; 310; 30; 20 MG/100ML; MG/100ML; MG/100ML; MG/100ML
125 INJECTION, SOLUTION INTRAVENOUS CONTINUOUS
Status: DISPENSED | OUTPATIENT
Start: 2018-02-27 | End: 2018-02-28

## 2018-02-27 RX ORDER — FAMOTIDINE 20 MG/1
40 TABLET, FILM COATED ORAL 2 TIMES DAILY
Status: DISCONTINUED | OUTPATIENT
Start: 2018-02-27 | End: 2018-03-01 | Stop reason: HOSPADM

## 2018-02-27 RX ORDER — DIPHENHYDRAMINE HYDROCHLORIDE 50 MG/ML
25 INJECTION, SOLUTION INTRAMUSCULAR; INTRAVENOUS
Status: DISCONTINUED | OUTPATIENT
Start: 2018-02-27 | End: 2018-03-01 | Stop reason: HOSPADM

## 2018-02-27 RX ORDER — OXYCODONE AND ACETAMINOPHEN 5; 325 MG/1; MG/1
1-2 TABLET ORAL
Status: DISCONTINUED | OUTPATIENT
Start: 2018-02-27 | End: 2018-03-01 | Stop reason: HOSPADM

## 2018-02-27 RX ORDER — NALOXONE HYDROCHLORIDE 0.4 MG/ML
0.2 INJECTION, SOLUTION INTRAMUSCULAR; INTRAVENOUS; SUBCUTANEOUS
Status: DISCONTINUED | OUTPATIENT
Start: 2018-02-27 | End: 2018-03-01 | Stop reason: HOSPADM

## 2018-02-27 RX ORDER — DIPHENHYDRAMINE HYDROCHLORIDE 50 MG/ML
25 INJECTION, SOLUTION INTRAMUSCULAR; INTRAVENOUS
Status: DISCONTINUED | OUTPATIENT
Start: 2018-02-27 | End: 2018-02-27

## 2018-02-27 RX ORDER — OXYTOCIN/RINGER'S LACTATE 20/1000 ML
125 PLASTIC BAG, INJECTION (ML) INTRAVENOUS CONTINUOUS
Status: DISCONTINUED | OUTPATIENT
Start: 2018-02-27 | End: 2018-03-01 | Stop reason: HOSPADM

## 2018-02-27 RX ORDER — FAMOTIDINE 20 MG/1
20 TABLET, FILM COATED ORAL 2 TIMES DAILY
COMMUNITY
End: 2018-05-14

## 2018-02-27 RX ORDER — IBUPROFEN 400 MG/1
800 TABLET ORAL
Status: DISCONTINUED | OUTPATIENT
Start: 2018-02-27 | End: 2018-03-01 | Stop reason: HOSPADM

## 2018-02-27 RX ORDER — TRISODIUM CITRATE DIHYDRATE AND CITRIC ACID MONOHYDRATE 500; 334 MG/5ML; MG/5ML
SOLUTION ORAL
Status: COMPLETED
Start: 2018-02-27 | End: 2018-02-27

## 2018-02-27 RX ORDER — SIMETHICONE 80 MG
80 TABLET,CHEWABLE ORAL
Status: DISCONTINUED | OUTPATIENT
Start: 2018-02-27 | End: 2018-03-01 | Stop reason: HOSPADM

## 2018-02-27 RX ORDER — MORPHINE SULFATE 1 MG/ML
INJECTION, SOLUTION EPIDURAL; INTRATHECAL; INTRAVENOUS AS NEEDED
Status: DISCONTINUED | OUTPATIENT
Start: 2018-02-27 | End: 2018-02-27 | Stop reason: HOSPADM

## 2018-02-27 RX ORDER — PROMETHAZINE HYDROCHLORIDE 25 MG/ML
25 INJECTION, SOLUTION INTRAMUSCULAR; INTRAVENOUS
Status: DISCONTINUED | OUTPATIENT
Start: 2018-02-27 | End: 2018-03-01 | Stop reason: HOSPADM

## 2018-02-27 RX ORDER — ACETAMINOPHEN 325 MG/1
650 TABLET ORAL
Status: DISCONTINUED | OUTPATIENT
Start: 2018-02-27 | End: 2018-03-01 | Stop reason: HOSPADM

## 2018-02-27 RX ORDER — ONDANSETRON 2 MG/ML
4 INJECTION INTRAMUSCULAR; INTRAVENOUS
Status: DISCONTINUED | OUTPATIENT
Start: 2018-02-27 | End: 2018-02-27

## 2018-02-27 RX ORDER — BUPIVACAINE HYDROCHLORIDE 7.5 MG/ML
INJECTION, SOLUTION INTRASPINAL AS NEEDED
Status: DISCONTINUED | OUTPATIENT
Start: 2018-02-27 | End: 2018-02-27 | Stop reason: HOSPADM

## 2018-02-27 RX ORDER — ZOLPIDEM TARTRATE 5 MG/1
5 TABLET ORAL
Status: DISCONTINUED | OUTPATIENT
Start: 2018-02-27 | End: 2018-03-01 | Stop reason: HOSPADM

## 2018-02-27 RX ORDER — KETOROLAC TROMETHAMINE 30 MG/ML
30 INJECTION, SOLUTION INTRAMUSCULAR; INTRAVENOUS
Status: DISPENSED | OUTPATIENT
Start: 2018-02-27 | End: 2018-03-01

## 2018-02-27 RX ORDER — PROMETHAZINE HYDROCHLORIDE 25 MG/ML
INJECTION, SOLUTION INTRAMUSCULAR; INTRAVENOUS
Status: DISCONTINUED
Start: 2018-02-27 | End: 2018-02-27

## 2018-02-27 RX ORDER — TRISODIUM CITRATE DIHYDRATE AND CITRIC ACID MONOHYDRATE 500; 334 MG/5ML; MG/5ML
30 SOLUTION ORAL
Status: COMPLETED | OUTPATIENT
Start: 2018-02-27 | End: 2018-02-27

## 2018-02-27 RX ORDER — ONDANSETRON 2 MG/ML
4 INJECTION INTRAMUSCULAR; INTRAVENOUS
Status: DISCONTINUED | OUTPATIENT
Start: 2018-02-27 | End: 2018-03-01 | Stop reason: HOSPADM

## 2018-02-27 RX ORDER — OXYTOCIN 10 [USP'U]/ML
INJECTION, SOLUTION INTRAMUSCULAR; INTRAVENOUS AS NEEDED
Status: DISCONTINUED | OUTPATIENT
Start: 2018-02-27 | End: 2018-02-27 | Stop reason: HOSPADM

## 2018-02-27 RX ORDER — SODIUM CHLORIDE 0.9 % (FLUSH) 0.9 %
5-10 SYRINGE (ML) INJECTION AS NEEDED
Status: DISCONTINUED | OUTPATIENT
Start: 2018-02-27 | End: 2018-02-27 | Stop reason: HOSPADM

## 2018-02-27 RX ORDER — ONDANSETRON 2 MG/ML
INJECTION INTRAMUSCULAR; INTRAVENOUS AS NEEDED
Status: DISCONTINUED | OUTPATIENT
Start: 2018-02-27 | End: 2018-02-27 | Stop reason: HOSPADM

## 2018-02-27 RX ORDER — SODIUM CHLORIDE 0.9 % (FLUSH) 0.9 %
5-10 SYRINGE (ML) INJECTION EVERY 8 HOURS
Status: DISCONTINUED | OUTPATIENT
Start: 2018-02-27 | End: 2018-02-27 | Stop reason: HOSPADM

## 2018-02-27 RX ORDER — MORPHINE SULFATE 4 MG/ML
2 INJECTION, SOLUTION INTRAMUSCULAR; INTRAVENOUS
Status: DISCONTINUED | OUTPATIENT
Start: 2018-02-27 | End: 2018-03-01 | Stop reason: HOSPADM

## 2018-02-27 RX ADMIN — BUPIVACAINE HYDROCHLORIDE 1.4 ML: 7.5 INJECTION, SOLUTION INTRASPINAL at 06:28

## 2018-02-27 RX ADMIN — MORPHINE SULFATE 0.3 MG: 1 INJECTION, SOLUTION EPIDURAL; INTRATHECAL; INTRAVENOUS at 06:28

## 2018-02-27 RX ADMIN — OXYTOCIN 20 UNITS: 10 INJECTION, SOLUTION INTRAMUSCULAR; INTRAVENOUS at 07:06

## 2018-02-27 RX ADMIN — SODIUM CHLORIDE, SODIUM LACTATE, POTASSIUM CHLORIDE, AND CALCIUM CHLORIDE 125 ML/HR: 600; 310; 30; 20 INJECTION, SOLUTION INTRAVENOUS at 13:30

## 2018-02-27 RX ADMIN — KETOROLAC TROMETHAMINE 30 MG: 30 INJECTION, SOLUTION INTRAMUSCULAR at 20:18

## 2018-02-27 RX ADMIN — FAMOTIDINE 40 MG: 20 TABLET ORAL at 16:06

## 2018-02-27 RX ADMIN — DIPHENHYDRAMINE HYDROCHLORIDE 25 MG: 50 INJECTION, SOLUTION INTRAMUSCULAR; INTRAVENOUS at 08:54

## 2018-02-27 RX ADMIN — SODIUM CITRATE AND CITRIC ACID MONOHYDRATE 30 ML: 500; 334 SOLUTION ORAL at 05:55

## 2018-02-27 RX ADMIN — SODIUM CHLORIDE, SODIUM LACTATE, POTASSIUM CHLORIDE, AND CALCIUM CHLORIDE 1000 ML: 600; 310; 30; 20 INJECTION, SOLUTION INTRAVENOUS at 04:50

## 2018-02-27 RX ADMIN — DIPHENHYDRAMINE HYDROCHLORIDE 25 MG: 50 INJECTION, SOLUTION INTRAMUSCULAR; INTRAVENOUS at 16:52

## 2018-02-27 RX ADMIN — SODIUM CHLORIDE, SODIUM LACTATE, POTASSIUM CHLORIDE, AND CALCIUM CHLORIDE 125 ML/HR: 600; 310; 30; 20 INJECTION, SOLUTION INTRAVENOUS at 22:50

## 2018-02-27 RX ADMIN — OXYTOCIN 20 UNITS: 10 INJECTION, SOLUTION INTRAMUSCULAR; INTRAVENOUS at 06:39

## 2018-02-27 RX ADMIN — MORPHINE SULFATE 2 MG: 1 INJECTION, SOLUTION EPIDURAL; INTRATHECAL; INTRAVENOUS at 07:09

## 2018-02-27 RX ADMIN — SODIUM CHLORIDE, SODIUM LACTATE, POTASSIUM CHLORIDE, AND CALCIUM CHLORIDE: 600; 310; 30; 20 INJECTION, SOLUTION INTRAVENOUS at 07:05

## 2018-02-27 RX ADMIN — SODIUM CHLORIDE, SODIUM LACTATE, POTASSIUM CHLORIDE, AND CALCIUM CHLORIDE: 600; 310; 30; 20 INJECTION, SOLUTION INTRAVENOUS at 06:20

## 2018-02-27 RX ADMIN — Medication 2.5 MG: at 06:35

## 2018-02-27 RX ADMIN — TRISODIUM CITRATE DIHYDRATE AND CITRIC ACID MONOHYDRATE 30 ML: 500; 334 SOLUTION ORAL at 05:55

## 2018-02-27 RX ADMIN — ONDANSETRON 4 MG: 2 INJECTION INTRAMUSCULAR; INTRAVENOUS at 06:40

## 2018-02-27 RX ADMIN — KETOROLAC TROMETHAMINE 30 MG: 30 INJECTION, SOLUTION INTRAMUSCULAR; INTRAVENOUS at 07:07

## 2018-02-27 RX ADMIN — PROMETHAZINE HYDROCHLORIDE 12.5 MG: 25 INJECTION INTRAMUSCULAR; INTRAVENOUS at 08:49

## 2018-02-27 RX ADMIN — KETOROLAC TROMETHAMINE 30 MG: 30 INJECTION, SOLUTION INTRAMUSCULAR at 13:29

## 2018-02-27 RX ADMIN — ONDANSETRON 4 MG: 2 INJECTION INTRAMUSCULAR; INTRAVENOUS at 08:20

## 2018-02-27 NOTE — ANESTHESIA PREPROCEDURE EVALUATION
Anesthetic History   No history of anesthetic complications            Review of Systems / Medical History  Patient summary reviewed and pertinent labs reviewed    Pulmonary  Within defined limits                 Neuro/Psych   Within defined limits           Cardiovascular  Within defined limits                     GI/Hepatic/Renal  Within defined limits              Endo/Other  Within defined limits           Other Findings   Comments: Documentation of current medication  Current medications obtained, documented and obtained? YES      Risk Factors for Postoperative nausea/vomiting:       History of postoperative nausea/vomiting? NO       Female? YES       Motion sickness? NO       Intended opioid administration for postoperative analgesia? YES      Smoking Abstinence:  Current Smoker? YES  Elective Surgery? NO  Seen preoperatively by anesthesiologist or proxy prior to day of surgery? YES  Pt abstained from smoking 24 hours prior to anesthesia?  NO    Preventive care/screening for High Blood Pressure:  Aged 18 years and older: YES  Screened for high blood pressure: YES  Patients with high blood pressure referred to primary care provider   for BP management: YES                     Physical Exam    Airway  Mallampati: II  TM Distance: 4 - 6 cm  Neck ROM: normal range of motion   Mouth opening: Normal     Cardiovascular               Dental  No notable dental hx       Pulmonary                 Abdominal  GI exam deferred       Other Findings            Anesthetic Plan    ASA: 2, emergent  Anesthesia type: MAC and spinal      Post-op pain plan if not by surgeon: epidural opioid      Anesthetic plan and risks discussed with: Patient

## 2018-02-27 NOTE — PROGRESS NOTES
Baby announcement.     [address] [facility]   Staff [address] [facility] [place][note 2]   [phone]

## 2018-02-27 NOTE — OP NOTES
Section Operative Note      Name: Marianna Bear Lecompte Record Number: [id number]   Today's Date: 2018      PREOP DIAGNOSIS:   1. Labor at 37 weeks  2. Breech presentation  POSTOP DIAGNOSIS: same  PROCEDURE: Primary low transverse   Lydia Ordonez MD    ASSISTANT: Briseida  ANESTHESIA: Spinal  EBL: 500  IVF:  1500cc  UOP:  250cc  ANTIBIOTICS: Ancef   COMPLICATIONS: none  CONDITION: stable to recovery    FETAL DESCRIPTION: gillespie female    BIRTH INFORMATION:   Information for the patient's :  Lina Dials [[id number]]   One Minute Apgar:  (Filed from Delivery Summary)  Five  Minute Apgar:  (Filed from Delivery Summary)      OPERATIVE FINDINGS:      At the time of the surgery a live Female delivered  with an APGAR of  8 and 9 at 1 and 5 minutes respectively. Amniotic fluid was Clear. Cord had 3 vessels. Inspection of the uterus, fallopian tubes and ovaries revealed normal anatomy. PROCEDURE:    Informed consent was obtained and risks discussed including risk of damage to bowel, bladder, nerves and blood vessels. Consent was obtained for blood transfusion in the case of emergency. The patient was taken to the operating room, where spinal anesthesia was found to be adequate. The patient was prepped and draped in the normal sterile fashion in the supine position. Pfannenstiel skin incision was made with the scalpel and carried down to the underlying fascia. The fascial incision was extended laterally with Isidro scissors. The superior aspect of the fascial incision was grasped with Kocher clamps and the underlying rectus muscles were dissected off bluntly and with Isidro scissors. The inferior aspect of the fascia was grasped  the underlying rectus muscles dissected off in a similar fashion. The rectus muscles were  in the midline. The peritoneum was tented and entered sharply with Metzenbaum scissors. The bladder blade was then inserted. The bladder flap was then created and the bladder blade reinserted. A low transverse uterine incision was made with the scalpel and extended laterally with bandage scissors. There was clear amniotic fluid. The breech was brought to the incision and delivered followed by lower extremeties, upper extremities and head. The nose and mouth were suctioned on the abdomen. The cord was clamped and cut and the baby was handed off to the waiting pediatricians. The placenta was then delivered spontaneously. The uterus was exteriorized and cleared of all clots and debris. The uterine incision was closed in two layers. The first layer with running locked layer of 0 Vicryl. The second layer was an imbricating layer of 0 Vicryl with good hemostasis assured. The uterus was returned to the pelvis. The paracolic gutters were irrigated with warm normal saline. The uterine incision was reinspected and found to be hemostatic. The rectus muscles were brought together with a figure of 8 stitch of 0 vicryl. The fascia was closed with 0 Vicryl in a running fashion. The subcuticular layers were reapproximated with 2-0 plain gut in interrupted fashion. The skin was closed with a 4-0 monocryl in a subcuticular fashion. Dermabond was placed on the incision. The patient tolerated the procedure well. Sponge, lap, and needle counts were correct times two and the patient was taken to recovery in stable condition.           Signed By:  Familia Messina MD     February 27, 2018

## 2018-02-27 NOTE — H&P
History & Physical    Name: Letty Amador MRN: [id number]  SSN: [id number]    YOB: 1985  Age: 28 y.o. Sex: female                Obstetrical Problem History & Physical      Name: Letty Amador MRN: [id number]  SSN: [id number]    YOB: 1985  Age: 28 y.o. Sex: female               32yo [id number] at 37.1wks with STEFANIE 3/18/18 by 7.5wk US in ED, consistent with 12.2wk US at [facility], unknown LMP. Patient has followed with [facility] since 12.2wks. She was found to be in thomas breech presentation by US at 36.1wks. ECV performed yesterday and fetus did not turn. Patient presents this morning with regular contractions.      OB HISTORY                                               G1 - 3/21/03 -  at 38wks, 6lb9oz, F, [address]  G2 - 04 -  at 26wks per pt, but reports weight 4lb5oz, PPROM/PTL 10 days later, M, [facility]  G3 - 1/10/106 -  at 32wks per pt, PPROM/PTL, M, [facility]  G4 - Followed with [facility] (wnl) until 24wks, Nova wkly injections until 36wks. S/p Rhogam 2017 and 17 (28.3wks).      GYNECOLOGY HISTORY:   Regular cycles, but unknown LMP. Last pap 17 ASCUS, HPV neg. History of abnormal pap with colposcopy , all normal paps since then. Denies h/o STIs, HSV.     PAST MEDICAL HISTORY  No past medical history on file. Right lateral thigh mass (likely lipoma) - patient follows with Dr. Adrian Sutton, general surgery. Initially planned excision, now no longer desires. Obesity - BMI 37 pre pregnancy     PAST SURGICAL HISTORY  No past surgical history on file.    Denies     SOCIAL HISTORY  Social History            Social History    Marital status:        Spouse name: N/A    Number of children: N/A    Years of education: N/A          Occupational History    Not on file.           Social History Main Topics    Smoking status: Not on file    Smokeless tobacco: Not on file    Alcohol use Not on file    Drug use: Not on file    Sexual activity: Not on file           Other Topics Concern    Not on file          Social History Narrative    No narrative on file      Smoked 1ppd cigarettes prior to pregnancy, quit at 32wks. Denies alcohol/illicit drug use. H/o marijuana use, quit 2017. Stay at home mother.     FAMILY HISTORY  No family history on file. Lung cancer - maternal grandfather, , unknown age of diagnosis  Non-Hodgkin's lymphoma - father, living in remission      ALLERGY:  No Known Allergies   NKDA     HOME MEDICATIONS:          Prior to Admission medications    Medication Sig Start Date End Date Taking? Authorizing Provider   promethazine (PHENERGAN) 25 mg tablet Take 1 Tab by mouth every six (6) hours as needed. 17     SIMON Merino   Prenatal vitamin po daily         Review of Systems:  A comprehensive review of systems was negative      Prenatal Labs  Blood type:  B negative  Antibody:  Negative  RPR/HepBsAg/HIV/HepC:  negative  GC/CT:  negative  1hr GTT:  159  3hr GTT:  wnl  TSH:  wnl  FTS/AFP:  wnl  Most recent Hgb 11.3, Plts 204 on 17  GBS positive     Objective:   VITAL SIGNS:  There were no vitals taken for this visit.     Physical Exam:  Gen:  NAD, A&O  CV:  RRR  Pulm:  CTAB  Abd: Gravid, nontender, soft, non distended  Extremities: nontender, trace edema LE bilaterally, no clubbing/cyanosis/redness     BSUS:  Breech     TOCO:  No contractions  FHTs;  130s, mod variability, +accels, no decels     Last growth US in office at 36.1wks - EFW 2806g, 46%, SDP 7.5cm, anterior fundal placenta     Assessment:      IUP at term  Active labor  Adolph breech     Plan:      Proceed with PCS for breech/labor. Anesthesia, SA and peds aware. Discussed risks of surgery including damage to bowel, bladder, nerves and blood vessels.  Informed consent obtained.    Solange Hurst MD

## 2018-02-27 NOTE — PROGRESS NOTES
3rd call to Dr. Deanna Desai; Dr. Eugena Sandifer requesting anesthesia as soon as possible; fetus is breech and patient is at 8cm.

## 2018-02-27 NOTE — PROGRESS NOTES
Pt is GBS Positive from urine from 2/12/18- the lab previously entered in the computer said  negative, tried to update to positive, GBS still says negative despite updating results.

## 2018-02-27 NOTE — PROGRESS NOTES
37w2d  arrived to units with c/o ctx x last 30 min, she is scheduled c/s for March____ for breech, had unsuccessful version yesterday. Pt denies leaking fluid or vag bleeding and reports fetal movement.  Per pt last meal was at 2100 last night and had milk around 0300 this am.

## 2018-02-27 NOTE — PROGRESS NOTES
~~ 0940 PT SETTLED INTO ROOM, SR UP X2, CB IN REACH. BABY SLEEPING IN CRIB, FRIEND RESTING ON COUCH. FOB TO BE RETURNING. PT DENIES NAUSEA. PT HAS MILD ITCHING BUT SAYS TOLERABLE. PAIN SCALE REVIEWED- PT RATES PAIN 2/10, SAYS ACCEPTABLE. IVF (2nd BAG OF PITOCIN) PUT ONTO PUMP, INFUSING @ 125CC/HR. NO SIGN OF INFILTRATION. HERNANDES DRAINING CL YELLOW URINE. SEQ STOCKINGS ON & PUMPING. PT ORIENTED TO ROOM- WHITE BOARD FILLED OUT. REVIEWED QUIET TIME. INCISION CHECKED- DERMABOND INTACT. PT INSTRUCTED ON USING INCENTIVE SPIROMETER- PT ABLE TO DEMO PAST 2500- REVIEWED FREQUENCY . ICE CHIPS PROVIDED--  PT ENCOURAGED VERY SLOW PO (AS SHE HAD N/V IN RR) & DISCUSSED ADVANCE AS TOLERATED. PT VOICES UNDERSTANDING.  REVIEWED W/ PT & FRIEND THAT 2nd ADULT MUST BE PRESENT FOR BABY TO ROOM- BOTH VOICE UNDERSTANDING-   ASSESSMENT AS CHARTED.     ~~1000 REPORT TO Dimas Garnica RN

## 2018-02-27 NOTE — LACTATION NOTE
Mom states baby is nursing/latching well. Helped position in football hold. Baby latched and nursed well. Discussed milk coming in, pacifiers, nursing pattern. Offered help. Info sheet, daily log and resource list given. Encouraged to call with questions.

## 2018-02-27 NOTE — PROGRESS NOTES
Spoke with Dr Alexandre Hernandez, discussed GBS +, and asked about antibiotics, no order for antibiotics received.

## 2018-02-27 NOTE — ANESTHESIA PROCEDURE NOTES
Spinal Block    Start time: 2/27/2018 6:24 AM  End time: 2/27/2018 6:29 AM  Performed by: Ludmila Moraes  Authorized by: Ludmila Moraes     Pre-procedure:   Indications: primary anesthetic  Preanesthetic Checklist: patient identified, risks and benefits discussed, anesthesia consent, site marked, patient being monitored and timeout performed    Timeout Time: 06:24          Spinal Block:   Patient Position:  Right lateral decubitus  Prep Region:  Lumbar  Prep: Betadine      Location:  L3-4    Local:  Lidocaine 1%  Local Dose (mL):  3    Needle:   Needle Type:  Pencan  Needle Gauge:  25 G  Attempts:  1      Events: CSF confirmed, no blood with aspiration and no paresthesia        Assessment:  Insertion:  Uncomplicated  Patient tolerance:  Patient tolerated the procedure well with no immediate complications

## 2018-02-27 NOTE — IP AVS SNAPSHOT
Daryn Connelly 
 
 
 [address] [place] [address] Patient: Elle Dorman MRN: [id number] XCM:[id number] About your hospitalization You were admitted on:  2018 You last received care in the:  [address] You were discharged on:  2018 Why you were hospitalized Your primary diagnosis was:  Not on File Your diagnoses also included:  Pregnancy Follow-up Information Follow up With Details Comments Contact Info [address] [facility] Schedule an appointment as soon as possible for a visit in 7 weeks  [facility], [address]) [id number] [phone] Facundo Moreno MD   [address] [facility] [address] 
[phone] Discharge Orders None A check erica indicates which time of day the medication should be taken. My Medications START taking these medications Instructions Each Dose to Equal  
 Morning Noon Evening Bedtime  
 docusate sodium 100 mg capsule Commonly known as:  Braden Huger Your last dose was: Your next dose is: Take 1 Cap by mouth two (2) times daily as needed for Constipation for up to 30 doses. 100 mg  
    
   
   
   
  
 ibuprofen 600 mg tablet Commonly known as:  MOTRIN Your last dose was: Your next dose is: Take 1 Tab by mouth every six (6) hours as needed for Pain. 600 mg  
    
   
   
   
  
 oxyCODONE-acetaminophen 5-325 mg per tablet Commonly known as:  PERCOCET Your last dose was: Your next dose is: Take 1-2 Tabs by mouth every six (6) hours as needed. Max Daily Amount: 8 Tabs. Indications: Pain, Post op  section 1-2 Tab CONTINUE taking these medications Instructions Each Dose to Equal  
 Morning Noon Evening Bedtime PEPCID 20 mg tablet Generic drug:  famotidine Your last dose was: Your next dose is: Take 20 mg by mouth two (2) times a day. Indications: Heartburn  
 20 mg  
    
   
   
   
  
 PNV No12-Iron-FA-DSS-OM-3 29 mg iron-1 mg -50 mg Cpkd Your last dose was: Your next dose is: Take 1 Tab by mouth daily. 1 Tab Where to Get Your Medications Information on where to get these meds will be given to you by the nurse or doctor. ! Ask your nurse or doctor about these medications  
  docusate sodium 100 mg capsule  
 ibuprofen 600 mg tablet  
 oxyCODONE-acetaminophen 5-325 mg per tablet Discharge Instructions CONGRATULATIONS ON THE BIRTH OF YOUR BABY! The first six weeks after childbirth is a time of physical and emotional adjustment. This handout will help to answer questions and provide guidance during the postpartum period. Every family's adjustment is unique, so please call if you have further concerns. At anytime we can be reached at [phone]. During office hours please ask to speak to a charge nurse. After hours, the answering service will take a message and the Nurse-Midwife on-call will return your call. If your question can wait until office hours: Monday-Friday 8:30-4:00, please do so. For emergencies or urgent concerns do not hesitate to call us after hours. DIET Your body is in need of a well-balanced, high protein diet to recuperate from birth. Please continue to take your prenatal vitamins for 6 weeks or as long as you are breastfeeding. Continue to drink at least 6-8 cups of water or other liquid a day. A breastfeeding mother also needs extra protein, calories and calcium containing foods. It is a good rule to drink fluids with every feeding in order to maintain an adequate milk supply and avoid dehydration.   Your baby will probably not be bothered by things in your diet, but if the baby seems extremely fussy or develops a rash, you may want to discuss possible food intolerances with your baby's care provider. PAIN MEDICATIONS Acetaminophen (Tylenol), ibuprofen (Motrin), or other prescribed pain medication may be taken as directed to relieve discomfort. The above medications pass in very minimal amounts into the breast milk and usually will not cause problems. There are medications that may affect the baby, so please consult your baby's care provider before taking medication. If you are breastfeeding, be sure to mention this to any care provider you see so that medications that are safe may be selected. There is an excellent resource called [employer] that is a resource for medication safety in pregnancy and lactation. You can visit their website at Yikuaiqu/ or call them toll free at [phone] if you have any questions about medication safety. UTERINE INVOLUTION / VAGINAL BLEEDING Involution is the process of the uterus returning to pre-pregnant size. It will take approximately six weeks for this process to occur. To achieve this size your uterus becomes firm to slow bleeding loss from the placental site. The first 7 days after birth, the bleeding is red and heavy. It may change with your activity and position. Some small clots are normal.   After ten days, the bleeding should be pale pink and slowed considerably. The next several weeks may progress to a pink, mucousy discharge. This may continue for 6-8 weeks, depending on your activity. During the first four weeks after delivery we recommend using sanitary pads instead of tampons. Douching should also be avoided, but it is fine to take a tub bath so long as the tub is very clean. ACTIVITY/EXERCISE Adequate rest is essential to recovery. Try to rest or sleep when the baby sleeps.   After two weeks, you may begin going for short walks, doing Kegel exercises and abdominal crunches. Avoid heavy, jarring or aerobic exercises. Remember to start out slowly and build up to your previous fitness level. Use common sense and don't overdo as rest is important and the benefits of increased rest are a quicker recovery. For the first two weeks after a  try to limit trips up or down steps. Do not lift anything heavier than the baby during this time. Lifting the baby or other objects should be done by bending at the knees rather than the waist.  Driving should be avoided during the first two to three weeks until you have the strength to push firmly on the brakes in case of an emergency. You may ride as a passenger, but DO wear a seat belt at all times. After a few weeks, you may resume normal activity at whatever pace is comfortable for you. Exercise may also be resumed gradually. Walking is a good way to start. Finally, try to be reasonable in your expectations. Caring for a new baby after major surgery can be quite trying. Arrange for assistance at home to ensure that you get enough rest.  
 
POSTPARTUM CHECK You may call the office when you return home to set up a postpartum visit. Most patients will be seen at 6 weeks after delivery, but after a  or other circumstances you may be seen in 2 weeks or less. If you are discharged from the hospital with staples that must be removed, you will be asked to come in sooner. At your postpartum visit, a pelvic exam may be performed. If you are having any problems or concerns, please do not hesitate to call. Once again our number is [phone]. MOOD CHANGES Significant hormonal changes occur in the days following delivery, and as a result, many women experience brief episodes of tearfulness or feeling \"blue. \"  These emotional swings may be made worse by lack of sleep and by the adjustments inherent in becoming a mother.   For some women, these fluctuations are minor. For others, they are overwhelming; creating feelings of anxiety, depression, or the inability to cope. If you have difficulty functioning as a result of feeling down, or if the mood changes seem severe, do not improve, or result is thoughts of harming yourself or others CALL RIGHT AWAY. PERINEAL CARE The basic goals of perineal care are to prevent infection, to relieve pain and promote healing. Your stitches will dissolve in four to six weeks, and do not need to be removed. After urinating, please continue to clean with warm water from front to back. Please continue sitz baths as instructed twice a day for a week or as needed. Call the office if you see pus in the suture site, or have unusual or severe swelling or pain that seems to be getting worse. INCISION CARE If you had a , clean and dry the incision gently as you would the rest of your body. Washing over the area with soap and water, and showering are fine. If steri-strips are present they will gradually come off with time. Tub baths are permitted. You may experience numbness and burning in the area surrounding the incision which usually resolves gradually over the next several weeks or months. RETURN OF MENSTRUATION Your first menstrual period may occur as soon as four to six weeks after your delivery if you are not breast-feeding. If breast-feeding it is more difficult to predict when your first period will occur. Even if you are not yet menstruating, you may be ovulating and it may be possible to conceive again. It is common for your first period after childbirth to be very heavy with an increased amount of cramping. BREASTS Breast-feeding Mothers: Colostrum is excreted in the first 24-72 hours. Mature breast milk will appear on the 2nd to 5th day. Engorgement may occur with the mature milk making your breasts feel warm and very full. Frequent feedings will make you more comfortable. Babies do not nurse on regular schedules. Nursing every 1 1/2 to 2 hours is normal and frequent feeding DOES NOT mean you are not making enough milk. To avoid nipple confusion, do not give bottles for the first 4 weeks. Growth spurts are common and may require more frequent feedings. This is the way baby increases your milk supply. During a growth spurt, you may feel you are feeding very frequently and that your breasts are \"empty. \"  Don't worry, your milk is produced by supply and demand so this increased frequency of feeding will increase your milk supply within 48 hours. Sore nipples may occur with frequent feedings and are sometimes also caused by improper latch. Check for a proper latch. Baby should have a wide open mouth. Use different positions at each feeding if possible. Express a small amount of colostrum or breast milk onto the sore area and leave bra flaps unlatched until dry. The lactation consultant at [facility] is available for outpatient consultation without charge. Call [phone] from Monday-Friday 9:00am- 3:00pm to arrange an outpatient appointment with her. Local [facility] and consultants may also be very helpful. If You Are Not Breast-feeding: You will experience swelling, engorgement and some milk production. There are no safe medications available to stop lactation. Some remedies for engorgement include: wearing a tight bra, ice packs and cold green cabbage leaves placed between the breast and your bra. Change these frequently. Tylenol or Motrin should help with the discomfort. SEXUAL ADJUSTMENTS We recommend that you wait at least four weeks before resuming sexual intercourse. A sore perineum, a demanding baby and fatigue will certainly affect your ability to enjoy lovemaking! A vaginal lubricant is recommended to help with any dryness.   It is very important to remember that you will ovulate BEFORE your first period and can conceive. If you do not wish another pregnancy right away, please take precautions to avoid pregnancy. If you would like a prescription method of birth control, please discuss this with us at your 6 week visit. ELIMINATION We remind all postpartum patients that it may take a few days for your bowels to return to normal, especially if you had a long labor. For those who had C-sections or severe lacerations, we recommend that you use a stool softener twice daily for at least two weeks. Many stool softeners are over-the-counter. Colace (Docusate Sodium) is recommended. Bulk forming agents such as Metamucil or Fibercon may be used daily in addition to a stool softener to promote regular bowel movements. Eating fresh fruits and vegetables along with whole grains is helpful as well. Do not be afraid to have a bowel movement as your stitches will not \"come out\" in the course of having a bowel movement. Urination may be difficult due to soreness around the urethra, or as an after effect of epidural.  This is temporary and can be helped  by squirting water over the perineum or try going in the shower. Hemorrhoids are common after birth. Tucks pads, Anusol cream and avoiding constipation are helpful. If constipation does occur, you may take Milk of Magnesia or Senekot according to the package instructions. DANGER SIGNS! CALL WITHOUT DELAY IF YOU ARE EXPERIENCING ANY OF THE FOLLOWING: 
* Unusually heavy bleeding, soaking more than 1 or more pads in an hour. * Vaginal discharge with strong foul odor. * Fever of 101 or higher * Unusual pain or tenderness in the abdominal area. * If breasts are red, hot or have a painful lump. * Depression that persists longer than 1-2 weeks or is severe. * Any urinary frequency accompanied by urgency or pain. * A lump in leg or calf especially if painful, warm or red. We thank you for choosing us for your prenatal care and/or delivery. We wish you all happiness and health with your baby for his or her lifetime! Kevan Wilhelm MD  
 
 
Patient armband removed and shredded [facility] Announcement We are excited to announce that we are making your provider's discharge notes available to you in [facility]. You will see these notes when they are completed and signed by the physician that discharged you from your recent hospital stay. If you have any questions or concerns about any information you see in [facility], please call the Health Information Department where you were seen or reach out to your Primary Care Provider for more information about your plan of care. Introducing [facility]! Gracie Wilkinson introduces [facility] patient portal. Now you can access parts of your medical record, email your doctor's office, and request medication refills online. 1. In your internet browser, go to [email or web address] 2. Click on the First Time User? Click Here link in the Sign In box. You will see the New Member Sign Up page. 3. Enter your [facility] Access Code exactly as it appears below. You will not need to use this code after youve completed the sign-up process. If you do not sign up before the expiration date, you must request a new code. · [facility] Access Code: [email or web address] Expires: 5/30/2018 10:22 AM 
 
4. Enter the last four digits of your Social Security Number (xxxx) and Date of Birth (mm/dd/yyyy) as indicated and click Submit. You will be taken to the next sign-up page. 5. Create a iConcludet ID. This will be your [facility] of Life login ID and cannot be changed, so think of one that is secure and easy to remember. 6. Create a [facility] password. You can change your password at any time. 7. Enter your Password Reset Question and Answer. This can be used at a later time if you forget your password. 8. Enter your e-mail address. You will receive e-mail notification when new information is available in [address]. 9. Click Sign Up. You can now view and download portions of your medical record. 10. Click the Download Summary menu link to download a portable copy of your medical information. If you have questions, please visit the Frequently Asked Questions section of the [employer] website. Remember, [employer] is NOT to be used for urgent needs. For medical emergencies, dial 911. Now available from your iPhone and Android! Providers Seen During Your Hospitalization Provider Specialty Primary office phone Beba Swann MD Obstetrics & Gynecology [phone] Immunizations Administered for This Admission Name Date Rho(D) Immune Globulin - IM 2018 Your Primary Care Physician (PCP) Primary Care Physician Office Phone Office Fax Iliana Lines [phone] You are allergic to the following No active allergies Recent Documentation Height Weight Breastfeeding? BMI OB Status Smoking Status 1.702 m 108.9 kg Unknown 37.59 kg/m2 Recent pregnancy Former Smoker Emergency Contacts Name Discharge Info Relation Home Work Mobile Sherly Stewart DISCHARGE CAREGIVER [3] Parent [1] [phone] Shadi Jarvis DISCHARGE CAREGIVER [3] Boyfriend [17] [phone] Patient Belongings The following personal items are in your possession at time of discharge: 
  Dental Appliances: None  Visual Aid: None      Home Medications: None   Jewelry: Earrings, Necklace, With patient  Clothing: Footwear, Pants, Shirt, With patient, Undergarments    Other Valuables: Cell Phone Discharge Instructions Attachments/References  SECTION: POST-OP (ENGLISH) BREASTFEEDING (ENGLISH) BREASTFEEDING MOTHERS: NUTRITION (ENGLISH) DEPRESSION: POSTPARTUM (ENGLISH) Patient Handouts  Section: What to Expect at [facility] Your Recovery A  section, or , is surgery to deliver your baby through a cut, called an incision, that the doctor makes in your lower belly and uterus. You may have some pain in your lower belly and need pain medicine for 1 to 2 weeks. You can expect some vaginal bleeding for several weeks. You will probably need about 6 weeks to fully recover. It is important to take it easy while the incision is healing. Avoid heavy lifting, strenuous activities, or exercises that strain the belly muscles while you are recovering. Ask a family member or friend for help with housework, cooking, and shopping. This care sheet gives you a general idea about how long it will take for you to recover. But each person recovers at a different pace. Follow the steps below to get better as quickly as possible. How can you care for yourself at home? Activity ? · Rest when you feel tired. Getting enough sleep will help you recover. ? · Try to walk each day. Start by walking a little more than you did the day before. Bit by bit, increase the amount you walk. Walking boosts blood flow and helps prevent pneumonia, constipation, and blood clots. ? · Avoid strenuous activities, such as bicycle riding, jogging, weightlifting, and aerobic exercise, for 6 weeks or until your doctor says it is okay. ? · Until your doctor says it is okay, do not lift anything heavier than your baby. ? · Do not do sit-ups or other exercises that strain the belly muscles for 6 weeks or until your doctor says it is okay. ? · Hold a pillow over your incision when you cough or take deep breaths. This will support your belly and decrease your pain. ? · You may shower as usual. Pat the incision dry when you are done. ? · You will have some vaginal bleeding. Wear sanitary pads. Do not douche or use tampons until your doctor says it is okay. ? · Ask your doctor when you can drive again. ? · You will probably need to take at least 6 weeks off work. It depends on the type of work you do and how you feel. ? · Ask your doctor when it is okay for you to have sex. Diet ? · You can eat your normal diet. If your stomach is upset, try bland, low-fat foods like plain rice, broiled chicken, toast, and yogurt. ? · Drink plenty of fluids (unless your doctor tells you not to). ? · You may notice that your bowel movements are not regular right after your surgery. This is common. Try to avoid constipation and straining with bowel movements. You may want to take a fiber supplement every day. If you have not had a bowel movement after a couple of days, ask your doctor about taking a mild laxative. ? · If you are breastfeeding, do not drink any alcohol. Medicines ? · Your doctor will tell you if and when you can restart your medicines. He or she will also give you instructions about taking any new medicines. ? · If you take blood thinners, such as warfarin (Coumadin), clopidogrel (Plavix), or aspirin, be sure to talk to your doctor. He or she will tell you if and when to start taking those medicines again. Make sure that you understand exactly what your doctor wants you to do. ? · Take pain medicines exactly as directed. ¨ If the doctor gave you a prescription medicine for pain, take it as prescribed. ¨ If you are not taking a prescription pain medicine, ask your doctor if you can take an over-the-counter medicine. ? · If you think your pain medicine is making you sick to your stomach: 
¨ Take your medicine after meals (unless your doctor has told you not to). ¨ Ask your doctor for a different pain medicine. ? · If your doctor prescribed antibiotics, take them as directed. Do not stop taking them just because you feel better. You need to take the full course of antibiotics. Incision care ? · If you have strips of tape on the incision, leave the tape on for a week or until it falls off. ? · Wash the area daily with warm, soapy water, and pat it dry. Don't use hydrogen peroxide or alcohol, which can slow healing. You may cover the area with a gauze bandage if it weeps or rubs against clothing. Change the bandage every day. ? · Keep the area clean and dry. Other instructions ? · If you breastfeed your baby, you may be more comfortable while you are healing if you place the baby so that he or she is not resting on your belly. Try tucking your baby under your arm, with his or her body along the side you will be feeding on. Support your baby's upper body with your arm. With that hand you can control your baby's head to bring his or her mouth to your breast. This is sometimes called the football hold. Follow-up care is a key part of your treatment and safety. Be sure to make and go to all appointments, and call your doctor if you are having problems. It's also a good idea to know your test results and keep a list of the medicines you take. When should you call for help? Call 911 anytime you think you may need emergency care. For example, call if: 
? · You passed out (lost consciousness). ? · You have chest pain, are short of breath, or cough up blood. ?Call your doctor now or seek immediate medical care if: 
? · You have pain that does not get better after you take pain medicine. ? · You have severe vaginal bleeding. ? · You are dizzy or lightheaded, or you feel like you may faint. ? · You have new or worse pain in your belly or pelvis. ? · You have loose stitches, or your incision comes open. ? · You have symptoms of infection, such as: 
¨ Increased pain, swelling, warmth, or redness. ¨ Red streaks leading from the incision. ¨ Pus draining from the incision. ¨ A fever. ? · You have symptoms of a blood clot in your leg (called a deep vein thrombosis), such as: 
¨ Pain in your calf, back of the knee, thigh, or groin. ¨ Redness and swelling in your leg or groin. ?Watch closely for changes in your health, and be sure to contact your doctor if: 
? · You do not get better as expected. Where can you learn more? Go to http://paul-gladys.info/. Enter M806 in the search box to learn more about \" Section: What to Expect at Home. \" Current as of: 2017 Content Version: 11.4 © 6144-6655 [employer]. Care instructions adapted under license by [employer] (which disclaims liability or warranty for this information). If you have questions about a medical condition or this instruction, always ask your healthcare professional. [address] any warranty or liability for your use of this information. Breastfeeding: Care Instructions Your Care Instructions Breastfeeding has many benefits. It may lower your baby's chances of getting an infection. It also may prevent your baby from having problems such as diabetes and high cholesterol later in life. Breastfeeding also helps you bond with your baby. The American Academy of Pediatrics recommends breastfeeding for at least a year. That may be very hard for many women to do, but breastfeeding even for a shorter period of time is a health benefit to you and your baby. In the first days after birth, your breasts make a thick, yellow liquid called colostrum. This liquid gives your baby nutrients and antibodies against infection. It is all that babies need in the first days after birth. Your breasts will fill with milk a few days after the birth. Breastfeeding is a skill that gets better with practice. It is common to have some problems. Some women have sore or cracked nipples, blocked milk ducts, or a breast infection (mastitis). But if you feed your baby every 1 to 2 hours during the day and follow the tips on this sheet, you may not have these problems. You can treat these problems if they happen and continue breastfeeding. Follow-up care is a key part of your treatment and safety. Be sure to make and go to all appointments, and call your doctor if you are having problems. It's also a good idea to know your test results and keep a list of the medicines you take. How can you care for yourself at home? · Breastfeed your baby whenever he or she is hungry. In the first 2 weeks, your baby will feed about every 1 to 3 hours. This will help you keep up your supply of milk. · Put a bed pillow or a nursing pillow on your lap to support your arms and your baby. · Hold your baby in a comfortable position. ¨ You can hold your baby in several ways. One of the most common positions is the cradle hold. One arm supports your baby, with his or her head in the bend of your elbow. Your open hand supports your baby's bottom or back. Your baby's belly lies against yours. ¨ If you had your baby by , or , try the football hold. This position keeps your baby off your belly. Tuck your baby under your arm, with his or her body along the side you will be feeding on. Support your baby's upper body with your arm. With that hand you can control your baby's head to bring his or her mouth to your breast. 
¨ Try different positions with each feeding. If you are having problems, ask for help from your doctor or a lactation consultant. · To get your baby to latch on: 
¨ Support and narrow your breast with one hand using a \"U hold,\" with your thumb on the outer side of your breast and your fingers on the inner side. You can also use a \"C hold,\" with all your fingers below the nipple and your thumb above it. Try the different holds to get the deepest latch for whichever breastfeeding position you use. Your other arm is behind your baby's back, with your hand supporting the base of the baby's head. Position your fingers and thumb to point toward your baby's ears.  
¨ You can touch your baby's lower lip with your nipple to get your baby to open his or her mouth. Wait until your baby opens up really wide, like a big yawn. Then be sure to bring the baby quickly to your breast-not your breast to the baby. As you bring your baby toward your breast, use your other hand to support the breast and guide it into his or her mouth. ¨ Both the nipple and a large portion of the darker area around the nipple (areola) should be in the baby's mouth. The baby's lips should be flared outward, not folded in (inverted). ¨ Listen for a regular sucking and swallowing pattern while the baby is feeding. If you cannot see or hear a swallowing pattern, watch the baby's ears, which will wiggle slightly when the baby swallows. If the baby's nose appears to be blocked by your breast, tilt the baby's head back slightly, so just the edge of one nostril is clear for breathing. ¨ When your baby is latched, you can usually remove your hand from supporting your breast and bring it under your baby to cradle him or her. Now just relax and breastfeed your baby. · You will know that your baby is feeding well when: 
¨ His or her mouth covers a lot of the areola, and the lips are flared out. ¨ His or her chin and nose rest against your breast. 
¨ Sucking is deep and rhythmic, with short pauses. ¨ You are able to see and hear your baby swallowing. ¨ You do not feel pain in your nipple. · If your baby takes only one breast at a feeding, start the next feeding on the other breast. 
· Anytime you need to remove your baby from the breast, put one finger in the corner of his or her mouth. Push your finger between your baby's gums to gently break the seal. If you do not break the tight seal before you remove your baby, your nipples can become sore, cracked, or bruised. · After feeding your baby, gently pat his or her back to let out any swallowed air. After your baby burps, offer the breast again, or offer the other breast. Sometimes a baby will want to keep feeding after being burped. When should you call for help? Call your doctor now or seek immediate medical care if: 
? · You have symptoms of a breast infection, such as: 
¨ Increased pain, swelling, redness, or warmth around a breast. 
¨ Red streaks extending from the breast. 
¨ Pus draining from a breast. 
¨ A fever. ? · Your baby has no wet diapers for 6 hours. ? Watch closely for changes in your health, and be sure to contact your doctor if: 
? · Your baby has trouble latching on to your breast.  
? · You continue to have pain or discomfort when breastfeeding. ? · You have other questions or concerns. Where can you learn more? Go to http://paul-gladys.info/. Enter P492 in the search box to learn more about \"Breastfeeding: Care Instructions. \" Current as of: March 16, 2017 Content Version: 11.4 © 5007-4068 [employer]. Care instructions adapted under license by [employer] (which disclaims liability or warranty for this information). If you have questions about a medical condition or this instruction, always ask your healthcare professional. [place] [address] any warranty or liability for your use of this information. Nutrition for Breastfeeding Mothers: Care Instructions Your Care Instructions When a woman breastfeeds her baby, she needs more nutrients to keep herself healthy and to make the baby's milk. Breastfeeding helps build the bond between you and your baby. It gives your baby excellent health benefits. A healthy diet includes eating a variety of foods from the basic food groups: grains, vegetables, fruits, milk and milk products (such as cheese and yogurt), and meat and dried beans. Eating well during breastfeeding will ensure that you stay healthy and your baby grows and develops normally. Follow-up care is a key part of your treatment and safety.  Be sure to make and go to all appointments, and call your doctor if you are having problems. It's also a good idea to know your test results and keep a list of the medicines you take. How can you care for yourself at home? · Include 3 to 4 cups of nonfat or low-fat milk or milk products in your diet every day. These include: ¨ Milk (8 ounces equals 1 cup). ¨ Ice cream (1½ cups equals 1 cup of milk). ¨ Cheese (1½ ounces of cheese equals 1 cup). ¨ Yogurt (8 ounces equals 1 cup). · Eat at least 7 ounces of grains, such as cereals, breads, crackers, rice, or pasta, every day. One ounce is about 1 slice of bread, 1 cup of breakfast cereal, or ½ cup of cooked rice, cereal, or pasta. · Eat 3 cups of vegetables each day. Choices include: ¨ Dark-green vegetables such as broccoli and spinach. ¨ Orange vegetables such as carrots and sweet potatoes. ¨ Dried beans (such as porter and kidney beans) and peas (such as lentils). · Every day, eat 2 cups of fresh, frozen, or canned fruit. · Eat 6½ ounces each day of protein, such as chicken, fish, lean meat, eggs, peanut butter, dried beans and peas, nuts, and seeds. One egg, 1 tablespoon of peanut butter, or ½ ounce of nuts or seeds equals 1 ounce of protein. A ½ cup of cooked beans equals 2 ounces of protein. · Drink plenty of fluids, enough so that your urine is light yellow or clear like water. If you have kidney, heart, or liver disease and have to limit fluids, talk with your doctor before you increase the amount of fluids you drink. · Limit caffeine products, such as coffee, tea, chocolate, and some sodas. Caffeine can pass to your baby through breast milk. It may cause fussiness and sleep problems in babies. · Your doctor may recommend a vitamin supplement. Take it as recommended. · Consider joining a breastfeeding support group. These are offered at many hospitals and birthing centers by nurses, nurse-midwives, or lactation consultants. When should you call for help?  
Watch closely for changes in your health, and be sure to contact your doctor if you have any problems. Where can you learn more? Go to http://paul-gladys.info/. Enter P234 in the search box to learn more about \"Nutrition for Breastfeeding Mothers: Care Instructions. \" Current as of: March 16, 2017 Content Version: 11.4 © 4283-2192 [employer]. Care instructions adapted under license by [employer] (which disclaims liability or warranty for this information). If you have questions about a medical condition or this instruction, always ask your healthcare professional. [address] any warranty or liability for your use of this information. Depression After Childbirth: Care Instructions Your Care Instructions Many women get the \"baby blues\" during the first few days after childbirth. You may lose sleep, feel irritable, and cry easily. You may feel happy one minute and sad the next. Hormone changes are one cause of these emotional changes. Also, the demands of a new baby, along with visits from relatives or other family needs, add to a mother's stress. The \"baby blues\" often peak around the fourth day. Then they ease up in less than 2 weeks. If your moodiness or anxiety lasts for more than 2 weeks, or if you feel like life is not worth living, you may have postpartum depression. This is different for each mother. Some mothers with serious depression may worry intensely about their infant's well-being. Others may feel distant from their child. Some mothers might even feel that they might harm their baby. A mother may have signs of paranoia, wondering if someone is watching her. Depression is not a sign of weakness. It is a medical condition that requires treatment. Medicine and counseling often work well to reduce depression. Talk to your doctor about taking antidepressant medicine while breastfeeding. Follow-up care is a key part of your treatment and safety.  Be sure to make and go to all appointments, and call your doctor if you are having problems. It's also a good idea to know your test results and keep a list of the medicines you take. How do you know if you are depressed? With all the changes in your life, you may not know if you are depressed. Pregnancy sometimes causes changes in how you feel that are similar to the symptoms of depression. Symptoms of depression include: · Feeling sad or hopeless and losing interest in daily activities. These are the most common symptoms of depression. · Sleeping too much or not enough. · Feeling tired. You may feel as if you have no energy. · Eating too much or too little. · Writing or talking about death, such as writing suicide notes or talking about guns, knives, or pills. Keep the numbers for these national suicide hotlines: 1-984-424-TALK (7-390.452.4265) and 5-568-TAGZMOY (4-754.486.3177). If you or someone you know talks about suicide or feeling hopeless, get help right away. How can you care for yourself at home? · Be safe with medicines. Take your medicines exactly as prescribed. Call your doctor if you think you are having a problem with your medicine. · Eat a healthy diet so that you can keep up your energy. · Get regular daily exercise, such as walks, to help improve your mood. · Get as much sunlight as possible. Keep your shades and curtains open. Get outside as much as you can. · Avoid using alcohol or other substances to feel better. · Get as much rest and sleep as possible. Avoid doing too much. Being too tired can increase depression. · Play stimulating music throughout your day and soothing music at night. · Schedule outings and visits with friends and family. Ask them to call you regularly, so that you do not feel alone. · Ask for help with preparing food and other daily tasks. Family and friends are often happy to help a mother with a . · Be honest with yourself and those who care about you. Tell them about your struggle. · Join a support group of new mothers. No one can better understand the challenges of caring for a  than other new mothers. · If you feel like life is not worth living or are feeling hopeless, get help right away. Keep the numbers for these national suicide hotlines: 9-869-419-TALK (4-431.733.4550) and 8-272-HBATCNP (7-704.151.8597). When should you call for help? Call 911 anytime you think you may need emergency care. For example, call if: 
? · You feel you cannot stop from hurting yourself, your baby, or someone else. ?Call your doctor now or seek immediate medical care if: 
? · You are having trouble caring for yourself or your baby. ? · You hear voices. ? Watch closely for changes in your health, and be sure to contact your doctor if: 
? · You have problems with your depression medicine. ? · You do not get better as expected. Where can you learn more? Go to http://paul-gladys.info/. Enter C335 in the search box to learn more about \"Depression After Childbirth: Care Instructions. \" Current as of: May 12, 2017 Content Version: 11.4 © 3863-6125 [employer]. Care instructions adapted under license by [employer] (which disclaims liability or warranty for this information). If you have questions about a medical condition or this instruction, always ask your healthcare professional. [address] any warranty or liability for your use of this information. Please provide this summary of care documentation to your next provider. Signatures-by signing, you are acknowledging that this After Visit Summary has been reviewed with you and you have received a copy. Patient Signature:  ____________________________________________________________ Date:  ____________________________________________________________  
  
Lorenza Mealing Provider Signature:  ____________________________________________________________ Date:  ____________________________________________________________

## 2018-02-27 NOTE — PROGRESS NOTES
Dr Callie Wells in room, discussing c/s will go after pt that is currently about to go to OR.  Ctx seem to be spacing per pt and per [facility]

## 2018-02-27 NOTE — PROGRESS NOTES
0615 sve done in OR per dr Lia Rome pt now 7 cm, anesthesia was already paged but not yet in OR, CRNA was in another c/s.  Anesthesia paged again per jarrod, to come to OR STAT for now rapid labor progression

## 2018-02-27 NOTE — PROGRESS NOTES
~~ 0718 ASSUME CARE OF PT JUST INTO THE RR AFTER PCS FOR BREECH @ 37 2/7 WKS UNDER SPINAL ANES- PAIN SCALE REVIEWED- PT IS 0/10. REVIEWED PAIN EXPECTATIONS. PT DENIES NAUSEA OR ITCHING.- INSTRUCTED TO LET RN KNOW IF PRESENT- PT UNDERSTANDS. IV CHECKED, PATENT W/O SIGN OF INFILTRATION INFUSING BAG #2 W/ PITOCIN, HERNANDES CHECKED, PATENT, DRAINING CL YELLOW URINE. INCISION CHECKED, DERMABOND INTACT. PT CANNOT WIGGLE TOES. DR UP X2, FOB IN NSY W/ BABY. POC REVIEWED    ~~0730 CALLED [facility] TO SEE IF BABY CAN COME & REQ FOB TO RETURN--     ~~ 0735 FOB HERE- BABY WARMING. REVIEWED W/ PT & FOB NO CHILDREN IN RR. REVIEWED NPO W/ SLOW ADVANCE AS TOLERATED. PT GIVEN COLD RAG FOR LIPS.      ~~0750 PT ON CELL. WHEN OFF REVIEWED 2nd ADULT NEEDED FOR BABY TO ROOM IN- PT & FOB VOICE UNDERSTANDING. PT MOVING LEG-    ~~0800 BABY IN TO BREAST FEED--  PT SITS UP HAS NAUSEA THEN MOD EMESIS    ~~ 1064 TRANSITION RN ASSISTS W/ LATCH-- GOOD LATCH & SUCK OBSERVED. FOB OUT, FRIEND IN    ~~ 0820 ZOFRAN GIVEN FOR N/V    ~~ 0822 BABY NURSED 18 MIN, NOW SLEEPING    ~~0825 BABY TO [facility] FOR PEDS CHECK THEN PEDS IN TO TALK TO PT    ~~ 7997 N/V CONTINUES & IS \"WORSE\" PER PT-- ANES CALLED & ORDER REC-   12.5MG PHENERGAN IN 100CC NS UP IVPB INFUSING SLOWLY. PT  NOW W/ \"BAD ITCHING\"--    ~~ 0854 BENADRYL GIVEN--   REVIEWED S/E OF BOTH MEDS & EXPECTED RESULTS    ~~0915 MEDS EFFECTIVE. PT SLEEPING. FOB OUT. FRIEND HOLDING BABY    ~~ 0925 PT EASILY AWAKENS--  HERNANDES BAG EMPTIED OF 200CC YELLOW URINE. MICHELLE CARE DONE, PAD & CHUX CHANGED. PT LIFTING WELL. OFFERED NEW GOWN, PT DECLINES SAYING SHE WILL CHANGE INTO OWN CLOTHS IN NEW ROOM-- PT GIVEN ABD SUPPORT (FOLDED BLANKET) FOR TRANSFER    ~~ 0935 PT TRANSFERRED TO [facility] VIA BED IN [place].  FRIEND ROLLING BABY IN CRIB-

## 2018-02-28 LAB
HCT VFR BLD AUTO: 30.8 % (ref 35–45)
HGB BLD-MCNC: 10.1 G/DL (ref 12–16)

## 2018-02-28 PROCEDURE — 74011250637 HC RX REV CODE- 250/637: Performed by: ADVANCED PRACTICE MIDWIFE

## 2018-02-28 PROCEDURE — 85018 HEMOGLOBIN: CPT | Performed by: OBSTETRICS & GYNECOLOGY

## 2018-02-28 PROCEDURE — 85014 HEMATOCRIT: CPT | Performed by: OBSTETRICS & GYNECOLOGY

## 2018-02-28 PROCEDURE — 74011250636 HC RX REV CODE- 250/636: Performed by: OBSTETRICS & GYNECOLOGY

## 2018-02-28 PROCEDURE — 36415 COLL VENOUS BLD VENIPUNCTURE: CPT | Performed by: OBSTETRICS & GYNECOLOGY

## 2018-02-28 PROCEDURE — 85461 HEMOGLOBIN FETAL: CPT | Performed by: OBSTETRICS & GYNECOLOGY

## 2018-02-28 PROCEDURE — 74011250637 HC RX REV CODE- 250/637: Performed by: OBSTETRICS & GYNECOLOGY

## 2018-02-28 PROCEDURE — 65270000029 HC RM PRIVATE

## 2018-02-28 PROCEDURE — 86900 BLOOD TYPING SEROLOGIC ABO: CPT | Performed by: OBSTETRICS & GYNECOLOGY

## 2018-02-28 RX ORDER — AMOXICILLIN 250 MG
1 CAPSULE ORAL DAILY
Status: DISCONTINUED | OUTPATIENT
Start: 2018-02-28 | End: 2018-03-01 | Stop reason: HOSPADM

## 2018-02-28 RX ADMIN — FAMOTIDINE 40 MG: 20 TABLET ORAL at 08:31

## 2018-02-28 RX ADMIN — DOCUSATE SODIUM AND SENNOSIDES 1 TABLET: 8.6; 5 TABLET, FILM COATED ORAL at 10:25

## 2018-02-28 RX ADMIN — IBUPROFEN 800 MG: 400 TABLET, FILM COATED ORAL at 10:25

## 2018-02-28 RX ADMIN — HUMAN RHO(D) IMMUNE GLOBULIN 0.3 MG: 300 INJECTION, SOLUTION INTRAMUSCULAR at 22:28

## 2018-02-28 RX ADMIN — OXYCODONE HYDROCHLORIDE AND ACETAMINOPHEN 2 TABLET: 5; 325 TABLET ORAL at 14:06

## 2018-02-28 RX ADMIN — FAMOTIDINE 40 MG: 20 TABLET ORAL at 19:01

## 2018-02-28 RX ADMIN — IBUPROFEN 800 MG: 400 TABLET, FILM COATED ORAL at 19:01

## 2018-02-28 RX ADMIN — KETOROLAC TROMETHAMINE 30 MG: 30 INJECTION, SOLUTION INTRAMUSCULAR at 03:56

## 2018-02-28 RX ADMIN — OXYCODONE HYDROCHLORIDE AND ACETAMINOPHEN 2 TABLET: 5; 325 TABLET ORAL at 22:28

## 2018-02-28 NOTE — PROGRESS NOTES
Post-Operative  Progress Note    Patient doing well post-op day 1 from  delivery without significant complaints. Pain controlled on current medication. Voiding without difficulty X1, normal lochia. Passing flatus. breastfeeding well. Tolerating diet. Vitals:   Patient Vitals for the past 8 hrs:   Temp Pulse Resp BP SpO2   18 0350 98.1 °F (36.7 °C) 75 16 91/45 98 %         Exam:  Patient without distress. Abdomen soft, fundus firm at level of umbilicus, non tender. Incision dry and clean without erythema. Lower extremities are negative for swelling, cords or tenderness. Lab/Data Review:  Lab Results  Component Value Date/Time   WBC 9.5 2018 04:38 AM   HGB 10.1 (L) 2018 06:20 AM   HCT 30.8 (L) 2018 06:20 AM   PLATELET 349  04:38 AM   MCV 85.9 2018 04:38 AM       Assessment:  POD # 1 s/p  section for breech    Plan:   Routine postop care. Advance diet. Encourage ambulation. Encourage breastfeeding.       Shanell Carmichael CNM  2018

## 2018-02-28 NOTE — PROGRESS NOTES
Patient doing well. Ambulating without complaints. Voiding without problems. Pain managed well with toradol. Bonding well with baby.

## 2018-02-28 NOTE — PROGRESS NOTES
Pt informed about unit hourly rounding policy to ensure safety and comfort for her and her baby during the night. Pt agrees to hourly rounding. Will continue to monitor.

## 2018-02-28 NOTE — ANESTHESIA POSTPROCEDURE EVALUATION
Post-Anesthesia Evaluation & Assessment    Visit Vitals    BP 91/45 (BP 1 Location: Left arm, BP Patient Position: At rest)    Pulse 75    Temp 36.7 °C (98.1 °F)    Resp 16    Ht 5' 7\" (1.702 m)    Wt 108.9 kg (240 lb)    SpO2 98%    Breastfeeding Unknown    BMI 37.59 kg/m2       Nausea/Vomiting: no nausea and no vomiting    Pain score (VAS): 0    Post-operative hydration adequate.     Mental status & Level of consciousness: orientation per pre-anesthetic level    Neurological status: moves all extremities, sensation grossly intact    Pulmonary status: airway patent, no supplemental oxygen required    Complications related to anesthesia: none    Additional comments:        Adaline Daily, CRNA  February 28, 2018

## 2018-03-01 VITALS
RESPIRATION RATE: 18 BRPM | HEIGHT: 67 IN | HEART RATE: 88 BPM | TEMPERATURE: 99.2 F | OXYGEN SATURATION: 98 % | BODY MASS INDEX: 37.67 KG/M2 | SYSTOLIC BLOOD PRESSURE: 125 MMHG | WEIGHT: 240 LBS | DIASTOLIC BLOOD PRESSURE: 59 MMHG

## 2018-03-01 PROBLEM — Z34.90 PREGNANCY: Status: RESOLVED | Noted: 2018-02-27 | Resolved: 2018-03-01

## 2018-03-01 LAB
ABO + RH BLD: NORMAL
ABO + RH BLDCO: NORMAL
BLD PROD TYP BPU: NORMAL
BPU ID: NORMAL
CALLED TO:,BCALL1: NORMAL
FETAL SCREEN,FMHS: NORMAL
STATUS OF UNIT,%ST: NORMAL
UNIT DIVISION, %UDIV: 0

## 2018-03-01 PROCEDURE — 74011250637 HC RX REV CODE- 250/637: Performed by: ADVANCED PRACTICE MIDWIFE

## 2018-03-01 PROCEDURE — 74011250637 HC RX REV CODE- 250/637: Performed by: OBSTETRICS & GYNECOLOGY

## 2018-03-01 RX ORDER — DOCUSATE SODIUM 100 MG/1
100 CAPSULE, LIQUID FILLED ORAL
Qty: 30 CAP | Refills: 0 | Status: SHIPPED | OUTPATIENT
Start: 2018-03-01 | End: 2018-05-14

## 2018-03-01 RX ORDER — IBUPROFEN 600 MG/1
600 TABLET ORAL
Qty: 30 TAB | Refills: 0 | Status: SHIPPED | OUTPATIENT
Start: 2018-03-01 | End: 2018-05-14

## 2018-03-01 RX ORDER — OXYCODONE AND ACETAMINOPHEN 5; 325 MG/1; MG/1
1-2 TABLET ORAL
Qty: 24 TAB | Refills: 0 | Status: SHIPPED | OUTPATIENT
Start: 2018-03-01 | End: 2018-05-14

## 2018-03-01 RX ADMIN — FAMOTIDINE 40 MG: 20 TABLET ORAL at 09:18

## 2018-03-01 RX ADMIN — IBUPROFEN 800 MG: 400 TABLET, FILM COATED ORAL at 03:19

## 2018-03-01 RX ADMIN — OXYCODONE HYDROCHLORIDE AND ACETAMINOPHEN 2 TABLET: 5; 325 TABLET ORAL at 03:19

## 2018-03-01 RX ADMIN — OXYCODONE HYDROCHLORIDE AND ACETAMINOPHEN 2 TABLET: 5; 325 TABLET ORAL at 08:28

## 2018-03-01 RX ADMIN — DOCUSATE SODIUM AND SENNOSIDES 1 TABLET: 8.6; 5 TABLET, FILM COATED ORAL at 09:18

## 2018-03-01 NOTE — DISCHARGE INSTRUCTIONS

## 2018-03-01 NOTE — PROGRESS NOTES
Mother doing well. Up without complaints. Voiding without problems. Pain managed well with Ibuprofen and percocet. Bonding well with baby.

## 2018-03-01 NOTE — DISCHARGE SUMMARY
Obstetrical Discharge Summary     Name: Brynn Bledsoe MRN: [id number]  SSN: [id number]    YOB: 1985  Age: 28 y.o. Sex: female      Admit Date: 2018    Discharge Date: 3/1/2018     Admitting Physician: Jesse Jones MD     Attending Physician:  Jesse Jones MD     Admission Diagnoses: maternity  Pregnancy    Discharge Diagnoses:   Information for the patient's :  Angel Chanel [[id number]]   Delivery of a 3.19 kg female infant via , Low Transverse on 2018 at 6:38 AM  by . Apgars were 8 and 9. Additional Diagnoses:    Lab Results   Component Value Date/Time    Rubella, External immune 2017    GrBStrep, External POSITIVE 2018 08:37 AM       Immunization(s):   Immunization History   Administered Date(s) Administered    DTaP 2018    Influenza Vaccine 10/02/2017    Rho(D) Immune Globulin - IM 2017, 2018, 2018        Labs: No results found for this or any previous visit (from the past 24 hour(s)). Exam:  Chest is clear to auscultation. Fundus firm and nontender  Incision: Clean and dry  Legs are normal without tenderness, swelling, or redness    Hospital Course: Normal hospital course following the delivery. Patient Instructions:   Current Discharge Medication List      START taking these medications    Details   oxyCODONE-acetaminophen (PERCOCET) 5-325 mg per tablet Take 1-2 Tabs by mouth every six (6) hours as needed. Max Daily Amount: 8 Tabs. Indications: Pain, Post op  section  Qty: 24 Tab, Refills: 0    Associated Diagnoses: 40 weeks gestation of pregnancy      docusate sodium (COLACE) 100 mg capsule Take 1 Cap by mouth two (2) times daily as needed for Constipation for up to 30 doses. Qty: 30 Cap, Refills: 0      ibuprofen (MOTRIN) 600 mg tablet Take 1 Tab by mouth every six (6) hours as needed for Pain.   Qty: 30 Tab, Refills: 0         CONTINUE these medications which have NOT CHANGED Details   PNV No12-Iron-FA-DSS-OM-3 29 mg iron-1 mg -50 mg CPKD Take 1 Tab by mouth daily. famotidine (PEPCID) 20 mg tablet Take 20 mg by mouth two (2) times a day. Indications: Heartburn             Reference my discharge instructions. No orders of the defined types were placed in this encounter.        Signed By:  Kishan Barragan MD     March 1, 2018

## 2018-03-01 NOTE — PROGRESS NOTES
Post-Operative  Progress Note    Patient doing well post op day 2 from  delivery without significant complaints. Pain controlled on current medication. Voiding without difficulty, normal lochia. Positive Flatus. Tolerating diet. Vitals: Afebrile,  OVSS        Exam:  Patient without distress. Abdomen soft, fundus firm at level of umbilicus, non tender. Incision dry and clean without erythema. Lower extremities are negative for swelling, cords or tenderness. Lab/Data Review:  Lab results reviewed. For significant abnormal values and values requiring intervention, see assessment and plan. No results found for this or any previous visit (from the past 8 hour(s)). Assessment: POD # 2 s/p  section. Doing well without problems. Plan:   Routine postop care. Encourage ambulation. Encourage breastfeeding. D/C instructions reviewed.        Brittny Chavarria MD  2018

## 2018-03-01 NOTE — LACTATION NOTE
Mother states baby is continuing to nurse well and has a strong urge to suck. Discussed hunger vs urge to suck, feeding frequency and milk coming in. General discussion, overall review and questions answered. Offered assistance if needed.

## 2018-03-21 ENCOUNTER — HOSPITAL ENCOUNTER (EMERGENCY)
Age: 33
Discharge: HOME | End: 2018-03-21
Attending: EMERGENCY MEDICINE | Admitting: EMERGENCY MEDICINE
Payer: MEDICAID

## 2018-03-21 VITALS
HEIGHT: 67 IN | SYSTOLIC BLOOD PRESSURE: 132 MMHG | BODY MASS INDEX: 36.57 KG/M2 | WEIGHT: 233 LBS | OXYGEN SATURATION: 97 % | TEMPERATURE: 98.7 F | RESPIRATION RATE: 18 BRPM | DIASTOLIC BLOOD PRESSURE: 62 MMHG | HEART RATE: 93 BPM

## 2018-03-21 DIAGNOSIS — L02.214 ABSCESS OF RIGHT GROIN: Primary | ICD-10-CM

## 2018-03-21 PROCEDURE — 99282 EMERGENCY DEPT VISIT SF MDM: CPT

## 2018-03-21 PROCEDURE — 75810000289 HC I&D ABSCESS SIMP/COMP/MULT

## 2018-03-21 NOTE — ED PROVIDER NOTES
HPI Comments: Patient is a 29 y/o female w/ no significant PMH who presents to the ER c/o abscess to the inside of her right groin. Patient states her symptoms began about 3 days ago. She has tried using warm, wet compresses and other home remedies with no improvement. She rates her pain 8/10. She has not taken anything for her pain. Walking and sitting make her pain worse. She denied any active drainage, and pt has no other symptoms or complaints. Patient is a 28 y.o. female presenting with abscess. The history is provided by the patient. Abscess           History reviewed. No pertinent past medical history. History reviewed. No pertinent surgical history. Family History:   Problem Relation Age of Onset   Sol Abarca Cancer Sister     No Known Problems Mother     Hypertension Father     Cancer Father        Social History     Social History    Marital status:      Spouse name: N/A    Number of children: N/A    Years of education: N/A     Occupational History    Not on file. Social History Main Topics    Smoking status: Former Smoker     Packs/day: 0.50     Years: 15.00     Quit date: 2018    Smokeless tobacco: Never Used    Alcohol use No    Drug use: No      Comment: previous noted marijuana use    Sexual activity: Yes     Partners: Male     Birth control/ protection: None     Other Topics Concern    Not on file     Social History Narrative    ** Merged History Encounter **              ALLERGIES: Review of patient's allergies indicates no known allergies. Review of Systems   Constitutional: Negative for chills, fatigue and fever. HENT: Negative. Negative for sore throat. Eyes: Negative. Respiratory: Negative for cough and shortness of breath. Cardiovascular: Negative for chest pain and palpitations. Gastrointestinal: Negative for abdominal pain, nausea and vomiting. Genitourinary: Negative for dysuria. Musculoskeletal: Negative. Skin: Positive for wound. Abscess to right groin   Neurological: Negative for dizziness, weakness, light-headedness and headaches. Psychiatric/Behavioral: Negative. All other systems reviewed and are negative. Vitals:    03/21/18 0027   BP: 132/62   Pulse: 93   Resp: 18   Temp: 98.7 °F (37.1 °C)   SpO2: 97%   Weight: 105.7 kg (233 lb)   Height: 5' 7\" (1.702 m)            Physical Exam   Constitutional: She is oriented to person, place, and time. She appears well-developed and well-nourished. No distress. HENT:   Head: Normocephalic and atraumatic. Mouth/Throat: Oropharynx is clear and moist.   Eyes: Conjunctivae are normal. No scleral icterus. Neck: Neck supple. No JVD present. No tracheal deviation present. Cardiovascular: Normal rate, regular rhythm and normal heart sounds. Pulmonary/Chest: Effort normal and breath sounds normal. No respiratory distress. She has no wheezes. Abdominal: Soft. There is no tenderness. Musculoskeletal: Normal range of motion. Neurological: She is alert and oriented to person, place, and time. She has normal strength. Gait normal. GCS eye subscore is 4. GCS verbal subscore is 5. GCS motor subscore is 6. Skin: Skin is warm and dry. Lesion noted. She is not diaphoretic. Psychiatric: She has a normal mood and affect. Nursing note and vitals reviewed. MDM  Number of Diagnoses or Management Options  Diagnosis management comments: 27 y/o female c/o abscess to right groin x several days. Has tried using warm wet compresses with no spontaneous drainage. Pt denied any fevers, chills. Rated pain 8/10, worse with walking or sitting. Verbal consent obtained from patient, and I/D performed with extensive foul, liquid drainage noted. Pt given wound care instructions and abx due to size. Advised to return in 48 hours for wound check if not improving. All questions answered and patient in agreement with plan of care. Will plan for discharge.   Pam Cowan, REZA    Clinical Impression:  Right groin abscess    Risk of Complications, Morbidity, and/or Mortality  Presenting problems: moderate  Diagnostic procedures: low  Management options: moderate    Patient Progress  Patient progress: stable        ED Course       I&D Abcess Complex  Date/Time: 3/23/2018 4:48 PM  Performed by: Samanta Peter by: Saige Monroe     Consent:     Consent obtained:  Verbal    Consent given by:  Patient    Risks discussed:  Bleeding, incomplete drainage, infection and pain    Alternatives discussed:  No treatment  Location:     Type:  Abscess    Size:  3 x 5    Location:  Anogenital    Anogenital location: right groin. Pre-procedure details:     Skin preparation:  Betadine  Anesthesia (see MAR for exact dosages): Anesthesia method:  Local infiltration    Local anesthetic:  Lidocaine 1% w/o epi  Procedure type:     Complexity:  Complex  Procedure details:     Needle aspiration: no      Incision types:  Single straight    Incision depth:  Dermal    Scalpel blade:  11    Wound management:  Extensive cleaning    Drainage:  Purulent    Drainage amount:  Copious    Wound treatment:  Wound left open    Packing materials:  None  Post-procedure details:     Patient tolerance of procedure: Tolerated well, no immediate complications               Vitals:  No data found. Medications ordered:   Medications - No data to display      Lab findings:  No results found for this or any previous visit (from the past 12 hour(s)). EKG interpretation by ED Physician:      X-Ray, CT or other radiology findings or impressions:  No orders to display       Progress notes, Consult notes or additional Procedure notes:       Reevaluation of patient:       Disposition:  Diagnosis: No diagnosis found.     Disposition: Discharged    Follow-up Information     None           Discharge Medication List as of 3/21/2018  5:49 AM      CONTINUE these medications which have NOT CHANGED    Details   PNV No12-Iron-FA-DSS-OM-3 29 mg iron-1 mg -50 mg CPKD Take 1 Tab by mouth daily. , Historical Med      oxyCODONE-acetaminophen (PERCOCET) 5-325 mg per tablet Take 1-2 Tabs by mouth every six (6) hours as needed. Max Daily Amount: 8 Tabs. Indications: Pain, Post op  section, Print, Disp-24 Tab, R-0      docusate sodium (COLACE) 100 mg capsule Take 1 Cap by mouth two (2) times daily as needed for Constipation for up to 30 doses. , Print, Disp-30 Cap, R-0      ibuprofen (MOTRIN) 600 mg tablet Take 1 Tab by mouth every six (6) hours as needed for Pain., Print, Disp-30 Tab, R-0      famotidine (PEPCID) 20 mg tablet Take 20 mg by mouth two (2) times a day. Indications: Heartburn, Historical Med      prenatal multivit-ca-min-fe-fa tab Take  by mouth., Historical Med      HYDROXYprogesterone, PF, (MARYAM) 250 mg/mL (1 mL) oil injection 250 mg by IntraMUSCular route every seven (7) days. , Historical Med

## 2018-03-21 NOTE — ED NOTES
The documentation for this period is being entered following the guidelines as defined in the [facility] policy by Cristóbal Santiago RN.

## 2025-01-13 NOTE — ROUTINE PROCESS
Bedside and Verbal shift change report given to [address] [place] [address] Street (oncoming nurse) by Johny Gallagher RN (offgoing nurse). Report included the following information SBAR, OR Summary, Procedure Summary, Intake/Output, MAR and Recent Results.
Bedside and Verbal shift change report given to Katt Porter RN  (oncoming nurse) by Dayami Dempsey RN  (offgoing nurse). Report included the following information SBAR, Kardex, Intake/Output, MAR and Recent Results. 0830--Assessment completed, Vitals stable. Educated patient on normal bleeding and when to call nurse for assistance. Fundus firm, lochia small. 1505--Assessment completed. Vitals stable. 1840--Mother doing well. Up without complaints. Voiding without problems. Pain managed well with motrin and percocet. Bonding well with baby.
Bedside and Verbal shift change report given to Shane Chowdary RN (oncoming nurse) by Mahesh Rios RN (offgoing nurse). Report included the following information SBAR, Procedure Summary, Intake/Output, MAR and Recent Results.
Bedside shift change report given to MIRELA Smith RN (oncoming nurse) by CHANTAL Rhoades RN (offgoing nurse). Report included the following information SBAR, Kardex, Intake/Output, MAR and Recent Results.
TRANSFER - IN REPORT:    Verbal report received from KYLAH Juares RN(name) on [employer]  being received from [facility](unit) for routine progression of care      Report consisted of patients Situation, Background, Assessment and   Recommendations(SBAR). Information from the following report(s) SBAR, Kardex, Intake/Output and MAR was reviewed with the receiving nurse. Opportunity for questions and clarification was provided. Assessment completed upon patients arrival to unit and care assumed. 1100--reports being comfortable--denies nausea--ice chips only for the present--slight ictching around face--family at bedside. 1315--mattie care done--lochia moderate--legs completely back to normal--was not able to eat clear liquid lunch--family has left--baby to the Nursery--encouraged to rest.  1850--vital signs stable--urine concentrated--po fluids encouraged--effective pain management with Duramorph and Toradol--breast feeding is going well.
Verbal shift change report given to Laura Alejandra RN (oncoming nurse) by Fady Edmond RN (offgoing nurse). Report included the following information Kardex, Intake/Output, MAR and Recent Results. 0915--assessment done--discharge planned for today--POC for discharge discussed--verbalizes understanding. 1100--discharge instructions reviewed and signed  1115--discharged via wheelchair with baby in arms. Baby transported home in a car seat.
No
(0) independent

## (undated) DEVICE — SUT MONOCRYL PLUS UD 4-0 --

## (undated) DEVICE — S/USE RESUS KIT W/O MASK (10): Brand: FISHER & PAYKEL HEALTHCARE

## (undated) DEVICE — TOWEL SURG W16XL26IN BLU NONFENESTRATED DLX ST 2 PER PK

## (undated) DEVICE — SUTURE PLN GUT SZ 2-0 L27IN ABSRB YELLOWISH TAN L70MM XLH 53T

## (undated) DEVICE — PACK PROCEDURE SURG C SECT DMC

## (undated) DEVICE — CATH KT SUC CTRL VLV 10FR --

## (undated) DEVICE — SUTURE VCRL SZ 0 L36IN ABSRB VLT L40MM CT 1/2 CIR J358H

## (undated) DEVICE — MEDI-VAC NON-CONDUCTIVE SUCTION TUBING 6MM X 6.1M (20 FT.) L: Brand: CARDINAL HEALTH

## (undated) DEVICE — ADHESIVE TISS DERMA FLEX 0.7ML -- HIGH VISCOSITY

## (undated) DEVICE — STERILE POLYISOPRENE POWDER-FREE SURGICAL GLOVES: Brand: PROTEXIS

## (undated) DEVICE — REM POLYHESIVE ADULT PATIENT RETURN ELECTRODE: Brand: VALLEYLAB

## (undated) DEVICE — SPONGE LAP 18X18IN STRL -- 5/PK

## (undated) DEVICE — MASK ROUND 60MM FPH (10) S/USE: Brand: FISHER & PAYKEL HEALTHCARE

## (undated) DEVICE — INTENDED FOR TISSUE SEPARATION, AND OTHER PROCEDURES THAT REQUIRE A SHARP SURGICAL BLADE TO PUNCTURE OR CUT.: Brand: BARD-PARKER SAFETY BLADES SIZE 10, STERILE

## (undated) DEVICE — GOWN,AURORA,FABRIC-REINFORCED,X-LARGE: Brand: MEDLINE

## (undated) DEVICE — MEDI-VAC NON-CONDUCTIVE SUCTION TUBING: Brand: CARDINAL HEALTH

## (undated) DEVICE — SOL IRR SOD CL 0.9% 1000ML BTL --

## (undated) DEVICE — SHEET,DRAPE,40X58,STERILE: Brand: MEDLINE

## (undated) DEVICE — KENDALL SCD EXPRESS SLEEVES, KNEE LENGTH, MEDIUM: Brand: KENDALL SCD

## (undated) DEVICE — TRAY CATH 16FR BLLN 5CC DRNGE BG 2000ML SIL F ANTIREFLX

## (undated) DEVICE — FLEX ADVANTAGE 1500CC: Brand: FLEX ADVANTAGE